# Patient Record
Sex: FEMALE | Race: WHITE | Employment: FULL TIME | URBAN - METROPOLITAN AREA
[De-identification: names, ages, dates, MRNs, and addresses within clinical notes are randomized per-mention and may not be internally consistent; named-entity substitution may affect disease eponyms.]

---

## 2017-10-13 ENCOUNTER — OFFICE VISIT (OUTPATIENT)
Dept: FAMILY MEDICINE CLINIC | Age: 32
End: 2017-10-13

## 2017-10-13 VITALS
DIASTOLIC BLOOD PRESSURE: 70 MMHG | BODY MASS INDEX: 18.95 KG/M2 | SYSTOLIC BLOOD PRESSURE: 124 MMHG | TEMPERATURE: 98.4 F | WEIGHT: 103 LBS | HEART RATE: 64 BPM | HEIGHT: 62 IN

## 2017-10-13 DIAGNOSIS — R21 RASH AND NONSPECIFIC SKIN ERUPTION: Primary | ICD-10-CM

## 2017-10-13 DIAGNOSIS — M25.541 ARTHRALGIA OF BOTH HANDS: ICD-10-CM

## 2017-10-13 DIAGNOSIS — M25.542 ARTHRALGIA OF BOTH HANDS: ICD-10-CM

## 2017-10-13 LAB
A/G RATIO: 1.7 (ref 1.1–2.2)
ALBUMIN SERPL-MCNC: 4.5 G/DL (ref 3.4–5)
ALP BLD-CCNC: 28 U/L (ref 40–129)
ALT SERPL-CCNC: 34 U/L (ref 10–40)
ANION GAP SERPL CALCULATED.3IONS-SCNC: 15 MMOL/L (ref 3–16)
AST SERPL-CCNC: 47 U/L (ref 15–37)
BASOPHILS ABSOLUTE: 0.1 K/UL (ref 0–0.2)
BASOPHILS RELATIVE PERCENT: 1 %
BILIRUB SERPL-MCNC: <0.2 MG/DL (ref 0–1)
BUN BLDV-MCNC: 14 MG/DL (ref 7–20)
C-REACTIVE PROTEIN: 0.4 MG/L (ref 0–5.1)
CALCIUM SERPL-MCNC: 9.7 MG/DL (ref 8.3–10.6)
CHLORIDE BLD-SCNC: 99 MMOL/L (ref 99–110)
CO2: 31 MMOL/L (ref 21–32)
CREAT SERPL-MCNC: 0.7 MG/DL (ref 0.6–1.1)
EOSINOPHILS ABSOLUTE: 0.2 K/UL (ref 0–0.6)
EOSINOPHILS RELATIVE PERCENT: 1.8 %
GFR AFRICAN AMERICAN: >60
GFR NON-AFRICAN AMERICAN: >60
GLOBULIN: 2.6 G/DL
GLUCOSE BLD-MCNC: 107 MG/DL (ref 70–99)
HCT VFR BLD CALC: 41.9 % (ref 36–48)
HEMOGLOBIN: 13.7 G/DL (ref 12–16)
LYMPHOCYTES ABSOLUTE: 4.2 K/UL (ref 1–5.1)
LYMPHOCYTES RELATIVE PERCENT: 46.3 %
MCH RBC QN AUTO: 31.6 PG (ref 26–34)
MCHC RBC AUTO-ENTMCNC: 32.7 G/DL (ref 31–36)
MCV RBC AUTO: 96.3 FL (ref 80–100)
MONOCYTES ABSOLUTE: 0.6 K/UL (ref 0–1.3)
MONOCYTES RELATIVE PERCENT: 7.1 %
NEUTROPHILS ABSOLUTE: 3.9 K/UL (ref 1.7–7.7)
NEUTROPHILS RELATIVE PERCENT: 43.8 %
PDW BLD-RTO: 12.3 % (ref 12.4–15.4)
PLATELET # BLD: 133 K/UL (ref 135–450)
PLATELET SLIDE REVIEW: ABNORMAL
PMV BLD AUTO: 11.7 FL (ref 5–10.5)
POTASSIUM SERPL-SCNC: 3.7 MMOL/L (ref 3.5–5.1)
RBC # BLD: 4.35 M/UL (ref 4–5.2)
RBC # BLD: NORMAL 10*6/UL
RHEUMATOID FACTOR: <10 IU/ML
SEDIMENTATION RATE, ERYTHROCYTE: 10 MM/HR (ref 0–20)
SLIDE REVIEW: ABNORMAL
SODIUM BLD-SCNC: 145 MMOL/L (ref 136–145)
TOTAL PROTEIN: 7.1 G/DL (ref 6.4–8.2)
WBC # BLD: 9 K/UL (ref 4–11)

## 2017-10-13 PROCEDURE — 36415 COLL VENOUS BLD VENIPUNCTURE: CPT | Performed by: NURSE PRACTITIONER

## 2017-10-13 PROCEDURE — 99204 OFFICE O/P NEW MOD 45 MIN: CPT | Performed by: NURSE PRACTITIONER

## 2017-10-13 NOTE — PROGRESS NOTES
Subjective:      Patient ID: Renee Woodruff is a 29 y.o. female. HPI Pt is here to establish care. Pt is 5 months clean of heroine. Pt is having peeling of hands 2 weeks ago. Pt said her fingers are numb. The hands are peeling which is painful. Patient was checked 2 months ago for hepatitis C and HIV which were both negative. Patient has not been using any new chemicals or other substances which could cause rash on skin. Patient does have family history of lupus and rheumatoid arthritis. Patient is having pain in bilateral hand joints and feet. Patient is having peeling of feet as well. Patient does janitorial work. Review of Systems   Constitutional: Positive for fatigue. Negative for chills and fever. Musculoskeletal: Positive for arthralgias. Skin: Positive for rash. Bilateral hand peeling   Neurological: Positive for weakness. All other systems reviewed and are negative. Objective:   Physical Exam   Constitutional: She is oriented to person, place, and time. She appears well-developed and well-nourished. HENT:   Crusted sore on inner  Bilateral nares   Cardiovascular: Normal rate, regular rhythm and normal heart sounds. No murmur heard. Pulmonary/Chest: Effort normal and breath sounds normal. She has no wheezes. She has no rales. Musculoskeletal:   Decrease in  strength in bilateral hands. Neurological: She is alert and oriented to person, place, and time. Skin: Skin is warm and dry. Rash noted. Skin peeling on bilateral hands. Patient also has skin peeling on bilateral feet. Psychiatric: She has a normal mood and affect. Judgment and thought content normal.   Vitals reviewed. Assessment:      1.  Rash and nonspecific skin eruption  RIANA    COMPREHENSIVE METABOLIC PANEL    CBC Auto Differential    SEDIMENTATION RATE    C-REACTIVE PROTEIN    RPR    silver sulfADIAZINE (SILVADENE) 1 % cream    mupirocin (BACTROBAN) 2 % ointment    Lauren Franks MD

## 2017-10-14 LAB — RPR: NORMAL

## 2017-10-16 ENCOUNTER — TELEPHONE (OUTPATIENT)
Dept: INTERNAL MEDICINE CLINIC | Age: 32
End: 2017-10-16

## 2017-10-16 LAB
ANA INTERPRETATION: NORMAL
ANTI-NUCLEAR ANTIBODY (ANA): NEGATIVE

## 2017-10-16 NOTE — TELEPHONE ENCOUNTER
Pt is calling for the results of labs drawn on Friday Oct 13, pt can be reached today at [phone].

## 2019-06-24 ENCOUNTER — APPOINTMENT (OUTPATIENT)
Dept: CT IMAGING | Age: 34
DRG: 343 | End: 2019-06-24
Payer: COMMERCIAL

## 2019-06-24 ENCOUNTER — HOSPITAL ENCOUNTER (INPATIENT)
Age: 34
LOS: 1 days | Discharge: HOME | DRG: 343 | End: 2019-06-25
Attending: EMERGENCY MEDICINE | Admitting: SURGERY
Payer: COMMERCIAL

## 2019-06-24 DIAGNOSIS — Z90.49 S/P LAPAROSCOPIC APPENDECTOMY: ICD-10-CM

## 2019-06-24 DIAGNOSIS — R11.0 NAUSEA WITHOUT VOMITING: ICD-10-CM

## 2019-06-24 DIAGNOSIS — R10.31 ABDOMINAL PAIN, RIGHT LOWER QUADRANT: ICD-10-CM

## 2019-06-24 DIAGNOSIS — K37 APPENDICITIS, UNSPECIFIED APPENDICITIS TYPE: ICD-10-CM

## 2019-06-24 DIAGNOSIS — K35.20 ACUTE APPENDICITIS WITH GENERALIZED PERITONITIS, WITHOUT GANGRENE OR ABSCESS, UNSPECIFIED WHETHER PERFORATION PRESENT: Primary | ICD-10-CM

## 2019-06-24 DIAGNOSIS — D72.829 LEUKOCYTOSIS, UNSPECIFIED TYPE: ICD-10-CM

## 2019-06-24 DIAGNOSIS — R74.8 ELEVATED LIVER ENZYMES: ICD-10-CM

## 2019-06-24 PROBLEM — K35.30 ACUTE APPENDICITIS WITH LOCALIZED PERITONITIS, WITHOUT PERFORATION OR GANGRENE: Status: ACTIVE | Noted: 2019-06-24

## 2019-06-24 LAB
A/G RATIO: 1.5 (ref 1.1–2.2)
ALBUMIN SERPL-MCNC: 4.9 G/DL (ref 3.4–5)
ALP BLD-CCNC: 55 U/L (ref 40–129)
ALT SERPL-CCNC: 547 U/L (ref 10–40)
AMORPHOUS: ABNORMAL /HPF
ANION GAP SERPL CALCULATED.3IONS-SCNC: 16 MMOL/L (ref 3–16)
AST SERPL-CCNC: 581 U/L (ref 15–37)
BACTERIA: ABNORMAL /HPF
BASOPHILS ABSOLUTE: 0.1 K/UL (ref 0–0.2)
BASOPHILS RELATIVE PERCENT: 0.4 %
BILIRUB SERPL-MCNC: 0.6 MG/DL (ref 0–1)
BILIRUBIN URINE: NEGATIVE
BLOOD, URINE: NEGATIVE
BUN BLDV-MCNC: 11 MG/DL (ref 7–20)
CALCIUM SERPL-MCNC: 9.6 MG/DL (ref 8.3–10.6)
CHLORIDE BLD-SCNC: 101 MMOL/L (ref 99–110)
CLARITY: ABNORMAL
CO2: 25 MMOL/L (ref 21–32)
COLOR: YELLOW
CREAT SERPL-MCNC: <0.5 MG/DL (ref 0.6–1.1)
EOSINOPHILS ABSOLUTE: 0 K/UL (ref 0–0.6)
EOSINOPHILS RELATIVE PERCENT: 0 %
EPITHELIAL CELLS, UA: ABNORMAL /HPF
GFR AFRICAN AMERICAN: >60
GFR NON-AFRICAN AMERICAN: >60
GLOBULIN: 3.2 G/DL
GLUCOSE BLD-MCNC: 87 MG/DL (ref 70–99)
GLUCOSE URINE: NEGATIVE MG/DL
HCG(URINE) PREGNANCY TEST: NEGATIVE
HCT VFR BLD CALC: 43.1 % (ref 36–48)
HEMOGLOBIN: 14.2 G/DL (ref 12–16)
KETONES, URINE: 15 MG/DL
LEUKOCYTE ESTERASE, URINE: NEGATIVE
LIPASE: 23 U/L (ref 13–60)
LYMPHOCYTES ABSOLUTE: 0.8 K/UL (ref 1–5.1)
LYMPHOCYTES RELATIVE PERCENT: 5.4 %
MCH RBC QN AUTO: 33 PG (ref 26–34)
MCHC RBC AUTO-ENTMCNC: 33 G/DL (ref 31–36)
MCV RBC AUTO: 100 FL (ref 80–100)
MICROSCOPIC EXAMINATION: YES
MONOCYTES ABSOLUTE: 0.4 K/UL (ref 0–1.3)
MONOCYTES RELATIVE PERCENT: 2.6 %
MUCUS: ABNORMAL /LPF
NEUTROPHILS ABSOLUTE: 13.4 K/UL (ref 1.7–7.7)
NEUTROPHILS RELATIVE PERCENT: 91.6 %
NITRITE, URINE: NEGATIVE
PDW BLD-RTO: 11.7 % (ref 12.4–15.4)
PH UA: 8 (ref 5–8)
PLATELET # BLD: 130 K/UL (ref 135–450)
PMV BLD AUTO: 11.9 FL (ref 5–10.5)
POTASSIUM SERPL-SCNC: 4.4 MMOL/L (ref 3.5–5.1)
PROTEIN UA: ABNORMAL MG/DL
RBC # BLD: 4.31 M/UL (ref 4–5.2)
RBC UA: ABNORMAL /HPF (ref 0–2)
SODIUM BLD-SCNC: 142 MMOL/L (ref 136–145)
SPECIFIC GRAVITY UA: 1.02 (ref 1–1.03)
TOTAL PROTEIN: 8.1 G/DL (ref 6.4–8.2)
URINE TYPE: ABNORMAL
UROBILINOGEN, URINE: 0.2 E.U./DL
WBC # BLD: 14.7 K/UL (ref 4–11)
WBC UA: ABNORMAL /HPF (ref 0–5)

## 2019-06-24 PROCEDURE — 83690 ASSAY OF LIPASE: CPT

## 2019-06-24 PROCEDURE — 96361 HYDRATE IV INFUSION ADD-ON: CPT

## 2019-06-24 PROCEDURE — 80074 ACUTE HEPATITIS PANEL: CPT

## 2019-06-24 PROCEDURE — 81001 URINALYSIS AUTO W/SCOPE: CPT

## 2019-06-24 PROCEDURE — 6360000002 HC RX W HCPCS: Performed by: NURSE PRACTITIONER

## 2019-06-24 PROCEDURE — 6360000004 HC RX CONTRAST MEDICATION: Performed by: NURSE PRACTITIONER

## 2019-06-24 PROCEDURE — 36415 COLL VENOUS BLD VENIPUNCTURE: CPT

## 2019-06-24 PROCEDURE — 1200000000 HC SEMI PRIVATE

## 2019-06-24 PROCEDURE — 85025 COMPLETE CBC W/AUTO DIFF WBC: CPT

## 2019-06-24 PROCEDURE — 96372 THER/PROPH/DIAG INJ SC/IM: CPT

## 2019-06-24 PROCEDURE — 96374 THER/PROPH/DIAG INJ IV PUSH: CPT

## 2019-06-24 PROCEDURE — 74177 CT ABD & PELVIS W/CONTRAST: CPT

## 2019-06-24 PROCEDURE — 99285 EMERGENCY DEPT VISIT HI MDM: CPT

## 2019-06-24 PROCEDURE — 2580000003 HC RX 258: Performed by: NURSE PRACTITIONER

## 2019-06-24 PROCEDURE — 80053 COMPREHEN METABOLIC PANEL: CPT

## 2019-06-24 PROCEDURE — 84703 CHORIONIC GONADOTROPIN ASSAY: CPT

## 2019-06-24 RX ORDER — DICYCLOMINE HYDROCHLORIDE 10 MG/ML
10 INJECTION INTRAMUSCULAR ONCE
Status: COMPLETED | OUTPATIENT
Start: 2019-06-24 | End: 2019-06-24

## 2019-06-24 RX ORDER — ONDANSETRON 2 MG/ML
4 INJECTION INTRAMUSCULAR; INTRAVENOUS ONCE
Status: COMPLETED | OUTPATIENT
Start: 2019-06-24 | End: 2019-06-24

## 2019-06-24 RX ORDER — 0.9 % SODIUM CHLORIDE 0.9 %
1000 INTRAVENOUS SOLUTION INTRAVENOUS ONCE
Status: COMPLETED | OUTPATIENT
Start: 2019-06-24 | End: 2019-06-24

## 2019-06-24 RX ADMIN — ONDANSETRON 4 MG: 2 INJECTION INTRAMUSCULAR; INTRAVENOUS at 20:17

## 2019-06-24 RX ADMIN — DICYCLOMINE HYDROCHLORIDE 10 MG: 20 INJECTION, SOLUTION INTRAMUSCULAR at 20:17

## 2019-06-24 RX ADMIN — IOPAMIDOL 75 ML: 755 INJECTION, SOLUTION INTRAVENOUS at 21:46

## 2019-06-24 RX ADMIN — SODIUM CHLORIDE 1000 ML: 9 INJECTION, SOLUTION INTRAVENOUS at 20:18

## 2019-06-24 ASSESSMENT — ENCOUNTER SYMPTOMS
COUGH: 0
NAUSEA: 1
BACK PAIN: 0
COLOR CHANGE: 0
DIARRHEA: 0
SHORTNESS OF BREATH: 0
VOMITING: 1
ABDOMINAL PAIN: 1
WHEEZING: 0

## 2019-06-24 ASSESSMENT — PAIN SCALES - GENERAL
PAINLEVEL_OUTOF10: 7
PAINLEVEL_OUTOF10: 7
PAINLEVEL_OUTOF10: 4

## 2019-06-24 ASSESSMENT — PAIN DESCRIPTION - PAIN TYPE
TYPE: ACUTE PAIN
TYPE: ACUTE PAIN

## 2019-06-24 ASSESSMENT — PAIN DESCRIPTION - LOCATION
LOCATION: ABDOMEN

## 2019-06-24 ASSESSMENT — PAIN DESCRIPTION - ORIENTATION: ORIENTATION: RIGHT;LEFT;LOWER;UPPER

## 2019-06-24 NOTE — ED PROVIDER NOTES
[facility] [place]  [facility]  [place] [place]NCOUNTER      Pt Name: Justin Vasquez  NRJ:[id number]  [place] 1985  Date of evaluation: 6/24/2019  Provider: SOREN Valenzuela CNP      Chief Complaint:    Chief Complaint   Patient presents with    Abdominal Pain     pain from the upper to lower abdomen, throwing up - pain started a few days ago and yesterday she started cramping and last night throwing up started.  Emesis       Nursing Notes, Past Medical Hx, Past Surgical Hx, Social Hx, Allergies, and Family Hx were all reviewed and agreed with or any disagreements were addressed in the HPI.    HPI:  (Location, Duration, Timing, Severity,Quality, Assoc Sx, Context, Modifying factors)  This is a  30 y.o. female who presents today with diffuse abdominal pain that began about 2-3 days ago. She states pain got worse last night. She has had nausea with vomiting, vomited 6 times today. Denies any diarrhea. She denies any vaginal bleeding or discharge, possible pregnancy. She denies any urinary symptoms. She denies any heroin use. PastMedical/Surgical History:      Diagnosis Date    Heroin abuse ([facility] [address] 4/15/2015    Unspecified breast disorder          Procedure Laterality Date    MOUTH SURGERY      tooth extractions       Medications:  Previous Medications    No medications on file         Review of Systems:  Review of Systems   Constitutional: Negative for chills and fever. HENT: Negative for congestion. Respiratory: Negative for cough, shortness of breath and wheezing. Cardiovascular: Negative for chest pain. Gastrointestinal: Positive for abdominal pain, nausea and vomiting. Negative for diarrhea. Patient was have diffuse abdominal cramping associated with nausea and vomiting however she has tenderness in the right lower quadrant. She is vomited 6 times since yesterday.    Genitourinary: Negative for difficulty urinating, dysuria, vaginal bleeding and vaginal discharge. She denies any vaginal bleeding or discharge, no urinary symptoms   Musculoskeletal: Negative for back pain. Skin: Negative for color change. Neurological: Negative for weakness, numbness and headaches. Positives and Pertinent negatives as per HPI. Except as noted above in the ROS, problem specific ROS was completed and is negative. Physical Exam:  Physical Exam   Constitutional: She is oriented to person, place, and time. She appears well-developed and well-nourished. HENT:   Head: Normocephalic. Right Ear: External ear normal.   Left Ear: External ear normal.   Mouth/Throat: Oropharynx is clear and moist.   Eyes: Right eye exhibits no discharge. Left eye exhibits no discharge. No scleral icterus. Neck: Normal range of motion. Neck supple. Cardiovascular: Normal rate and normal heart sounds. Pulmonary/Chest: Effort normal and breath sounds normal. No respiratory distress. Abdominal: Soft. There is tenderness. There is guarding. Abdomen flat soft nondistended. Bowel sounds are hypoactive, tenderness in the right lower quadrant of the abdomen, she has tenderness and guarding on exam, no acute ascites or rigidity. No rebound tenderness. Musculoskeletal: Normal range of motion. Neurological: She is alert and oriented to person, place, and time. GCS eye subscore is 4. GCS verbal subscore is 5. GCS motor subscore is 6. Skin: Skin is warm. She is not diaphoretic. No pallor. Psychiatric: She has a normal mood and affect. Her behavior is normal.   Nursing note and vitals reviewed.       MEDICAL DECISION MAKING    Vitals:    Vitals:    06/24/19 1900 06/24/19 2023 06/24/19 2111 06/24/19 2238   BP: 129/83 118/75 123/73 113/72   Pulse: 83 89 80 73   Resp: 18 18 18 18   Temp:       TempSrc:       SpO2: 99% 99% 98% 99%   Weight:       Height:           LABS:  Labs Reviewed   CBC WITH AUTO DIFFERENTIAL - Abnormal; Notable for the following components:       Result Value    WBC 14.7 (*)     RDW 11.7 (*)     Platelets 049 (*)     MPV 11.9 (*)     Neutrophils # 13.4 (*)     Lymphocytes # 0.8 (*)     All other components within normal limits    Narrative:     Performed at:  [place] [address] [facility] [employer]   Phone [phone]   COMPREHENSIVE METABOLIC PANEL - Abnormal; Notable for the following components:    CREATININE <0.5 (*)      (*)      (*)     All other components within normal limits    Narrative:     Performed at:  [place] [address] [employer]   Phone [phone]   URINALYSIS - Abnormal; Notable for the following components:    Clarity, UA SL CLOUDY (*)     Ketones, Urine 15 (*)     Protein, UA TRACE (*)     All other components within normal limits    Narrative:     Performed at:  [place] [address] [employer]   Phone [phone]   MICROSCOPIC URINALYSIS - Abnormal; Notable for the following components:    Mucus, UA 1+ (*)     Bacteria, UA Rare (*)     Amorphous, UA 2+ (*)     All other components within normal limits    Narrative:     Performed at:  [place] [address] [employer]   Phone [phone]   LIPASE    Narrative:     Performed at:  [place] [address] [employer]   Phone [phone]   PREGNANCY, URINE    Narrative:     Performed at:  [address], [facility] [employer]   Phone  of labs reviewed and werenegative at this time or not returned at the time of this note.     RADIOLOGY:   Non-plain film images such as CT, Ultrasound and MRI are read by the radiologist. Leyda JALLOH, SOREN - CNP have directly visualized the radiologic plain film image(s) with the below findings:        Interpretation per the Radiologist below, if available at the time of thisnote:    CT ABDOMEN PELVIS W IV CONTRAST Additional Contrast? None   Final Result   Non perforated acute appendicitis. No results found. MEDICAL DECISION MAKING / ED COURSE:      PROCEDURES:   Procedures    None    Patient was given:  Medications   0.9 % sodium chloride bolus (0 mLs Intravenous Stopped 6/24/19 2238)   dicyclomine (BENTYL) injection 10 mg (10 mg Intramuscular Given 6/24/19 2017)   ondansetron (ZOFRAN) injection 4 mg (4 mg Intravenous Given 6/24/19 2017)   iopamidol (ISOVUE-370) 76 % injection 75 mL (75 mLs Intravenous Given 6/24/19 2146)       Patient presents today with complaints of right lower quadrant and generalized abdominal cramping associate with nausea and vomiting, has vomited 6 times. After evaluation in his admission patient IV access, blood work, pain medicine, nausea medicine and a CT scan of the abdomen were ordered. Urinalysis is no acute infection. Pregnancy is negative. Ketones of 15 should improve with IV fluids. CBC shows a leukocytosis with a white count of 14,700, no acute anemia. Metabolic panel shows no significant electrolyte disturbances or renal insufficiency. ALT and AST are both elevated and ALT of 547 and AST of 581, I did add on a hepatitis panel however, I do believe this is related to the patient's history of drug use. CT of the abdomen shows a nonperforated acute appendicitis. I then paged general surgery on call. At 2322 I spoke with Dr. Milo Butterfield, general surgeon on-call, we discussed the patient's case at length and he agreed to accept the patient for admission. Patient to be admitted to hospital for further evaluation management of care. The patient tolerated their visit well. I evaluated the patient. The physician was available for consultation as needed.   The patient and / or the family were informed of the results of anytests, a time was

## 2019-06-24 NOTE — LETTER
[address] [facility] [address]  Phone: [phone]    Dr. Yael Carmona      June 25, 2019     Patient: Veronica Cheema   YOB: 1985   Date of Visit: 6/24/2019       To Whom It May Concern: It is my medical opinion that Kira Henley may not return to work until follow up appointment with surgeon in two weeks. If you have any questions or concerns, please don't hesitate to call.     Sincerely,        Dr. Yael Carmona

## 2019-06-25 ENCOUNTER — ANESTHESIA (OUTPATIENT)
Dept: OPERATING ROOM | Age: 34
DRG: 343 | End: 2019-06-25
Payer: COMMERCIAL

## 2019-06-25 ENCOUNTER — ANESTHESIA EVENT (OUTPATIENT)
Dept: OPERATING ROOM | Age: 34
DRG: 343 | End: 2019-06-25
Payer: COMMERCIAL

## 2019-06-25 VITALS — TEMPERATURE: 98.6 F | DIASTOLIC BLOOD PRESSURE: 79 MMHG | OXYGEN SATURATION: 100 % | SYSTOLIC BLOOD PRESSURE: 102 MMHG

## 2019-06-25 VITALS
OXYGEN SATURATION: 95 % | WEIGHT: 112.9 LBS | HEART RATE: 74 BPM | RESPIRATION RATE: 15 BRPM | SYSTOLIC BLOOD PRESSURE: 99 MMHG | TEMPERATURE: 97.8 F | DIASTOLIC BLOOD PRESSURE: 64 MMHG | HEIGHT: 62 IN | BODY MASS INDEX: 20.78 KG/M2

## 2019-06-25 LAB
ALBUMIN SERPL-MCNC: 3.5 G/DL (ref 3.4–5)
ALP BLD-CCNC: 42 U/L (ref 40–129)
ALT SERPL-CCNC: 357 U/L (ref 10–40)
ANION GAP SERPL CALCULATED.3IONS-SCNC: 11 MMOL/L (ref 3–16)
AST SERPL-CCNC: 338 U/L (ref 15–37)
BASOPHILS ABSOLUTE: 0 K/UL (ref 0–0.2)
BASOPHILS RELATIVE PERCENT: 0.4 %
BILIRUB SERPL-MCNC: 0.7 MG/DL (ref 0–1)
BILIRUBIN DIRECT: <0.2 MG/DL (ref 0–0.3)
BILIRUBIN, INDIRECT: ABNORMAL MG/DL (ref 0–1)
BUN BLDV-MCNC: 11 MG/DL (ref 7–20)
CALCIUM SERPL-MCNC: 8.6 MG/DL (ref 8.3–10.6)
CHLORIDE BLD-SCNC: 106 MMOL/L (ref 99–110)
CO2: 24 MMOL/L (ref 21–32)
CREAT SERPL-MCNC: <0.5 MG/DL (ref 0.6–1.1)
EOSINOPHILS ABSOLUTE: 0 K/UL (ref 0–0.6)
EOSINOPHILS RELATIVE PERCENT: 0.3 %
GFR AFRICAN AMERICAN: >60
GFR NON-AFRICAN AMERICAN: >60
GLUCOSE BLD-MCNC: 66 MG/DL (ref 70–99)
GLUCOSE BLD-MCNC: 74 MG/DL (ref 70–99)
HAV IGM SER IA-ACNC: ABNORMAL
HCT VFR BLD CALC: 37.2 % (ref 36–48)
HEMOGLOBIN: 12.7 G/DL (ref 12–16)
HEPATITIS B CORE IGM ANTIBODY: ABNORMAL
HEPATITIS B SURFACE ANTIGEN INTERPRETATION: ABNORMAL
HEPATITIS C ANTIBODY INTERPRETATION: REACTIVE
LYMPHOCYTES ABSOLUTE: 2 K/UL (ref 1–5.1)
LYMPHOCYTES RELATIVE PERCENT: 21.9 %
MAGNESIUM: 2.2 MG/DL (ref 1.8–2.4)
MCH RBC QN AUTO: 33.7 PG (ref 26–34)
MCHC RBC AUTO-ENTMCNC: 34 G/DL (ref 31–36)
MCV RBC AUTO: 99.1 FL (ref 80–100)
MONOCYTES ABSOLUTE: 0.7 K/UL (ref 0–1.3)
MONOCYTES RELATIVE PERCENT: 7.7 %
NEUTROPHILS ABSOLUTE: 6.5 K/UL (ref 1.7–7.7)
NEUTROPHILS RELATIVE PERCENT: 69.7 %
PDW BLD-RTO: 12 % (ref 12.4–15.4)
PERFORMED ON: NORMAL
PHOSPHORUS: 3.7 MG/DL (ref 2.5–4.9)
PLATELET # BLD: 100 K/UL (ref 135–450)
PLATELET SLIDE REVIEW: ABNORMAL
PMV BLD AUTO: 11.7 FL (ref 5–10.5)
POTASSIUM SERPL-SCNC: 3.7 MMOL/L (ref 3.5–5.1)
RBC # BLD: 3.75 M/UL (ref 4–5.2)
SLIDE REVIEW: ABNORMAL
SODIUM BLD-SCNC: 141 MMOL/L (ref 136–145)
TOTAL PROTEIN: 6.1 G/DL (ref 6.4–8.2)
WBC # BLD: 9.3 K/UL (ref 4–11)

## 2019-06-25 PROCEDURE — 87070 CULTURE OTHR SPECIMN AEROBIC: CPT

## 2019-06-25 PROCEDURE — 84100 ASSAY OF PHOSPHORUS: CPT

## 2019-06-25 PROCEDURE — 87205 SMEAR GRAM STAIN: CPT

## 2019-06-25 PROCEDURE — 3700000001 HC ADD 15 MINUTES (ANESTHESIA): Performed by: SURGERY

## 2019-06-25 PROCEDURE — 0DTJ4ZZ RESECTION OF APPENDIX, PERCUTANEOUS ENDOSCOPIC APPROACH: ICD-10-PCS | Performed by: SURGERY

## 2019-06-25 PROCEDURE — 80076 HEPATIC FUNCTION PANEL: CPT

## 2019-06-25 PROCEDURE — 85025 COMPLETE CBC W/AUTO DIFF WBC: CPT

## 2019-06-25 PROCEDURE — 88304 TISSUE EXAM BY PATHOLOGIST: CPT

## 2019-06-25 PROCEDURE — 6370000000 HC RX 637 (ALT 250 FOR IP): Performed by: SURGERY

## 2019-06-25 PROCEDURE — 6360000002 HC RX W HCPCS: Performed by: SURGERY

## 2019-06-25 PROCEDURE — 44970 LAPAROSCOPY APPENDECTOMY: CPT | Performed by: SURGERY

## 2019-06-25 PROCEDURE — 2500000003 HC RX 250 WO HCPCS: Performed by: SURGERY

## 2019-06-25 PROCEDURE — 99220 PR INITIAL OBSERVATION CARE/DAY 70 MINUTES: CPT | Performed by: SURGERY

## 2019-06-25 PROCEDURE — 6360000002 HC RX W HCPCS: Performed by: NURSE ANESTHETIST, CERTIFIED REGISTERED

## 2019-06-25 PROCEDURE — 80048 BASIC METABOLIC PNL TOTAL CA: CPT

## 2019-06-25 PROCEDURE — 7100000001 HC PACU RECOVERY - ADDTL 15 MIN: Performed by: SURGERY

## 2019-06-25 PROCEDURE — 2709999900 HC NON-CHARGEABLE SUPPLY: Performed by: SURGERY

## 2019-06-25 PROCEDURE — 2580000003 HC RX 258: Performed by: NURSE ANESTHETIST, CERTIFIED REGISTERED

## 2019-06-25 PROCEDURE — 36415 COLL VENOUS BLD VENIPUNCTURE: CPT

## 2019-06-25 PROCEDURE — 2500000003 HC RX 250 WO HCPCS: Performed by: NURSE ANESTHETIST, CERTIFIED REGISTERED

## 2019-06-25 PROCEDURE — 2580000003 HC RX 258: Performed by: SURGERY

## 2019-06-25 PROCEDURE — 2720000010 HC SURG SUPPLY STERILE: Performed by: SURGERY

## 2019-06-25 PROCEDURE — 3600000014 HC SURGERY LEVEL 4 ADDTL 15MIN: Performed by: SURGERY

## 2019-06-25 PROCEDURE — 7100000000 HC PACU RECOVERY - FIRST 15 MIN: Performed by: SURGERY

## 2019-06-25 PROCEDURE — 3600000004 HC SURGERY LEVEL 4 BASE: Performed by: SURGERY

## 2019-06-25 PROCEDURE — 3700000000 HC ANESTHESIA ATTENDED CARE: Performed by: SURGERY

## 2019-06-25 PROCEDURE — 83735 ASSAY OF MAGNESIUM: CPT

## 2019-06-25 RX ORDER — BUPIVACAINE HYDROCHLORIDE AND EPINEPHRINE 5; 5 MG/ML; UG/ML
INJECTION, SOLUTION PERINEURAL PRN
Status: DISCONTINUED | OUTPATIENT
Start: 2019-06-25 | End: 2019-06-25 | Stop reason: ALTCHOICE

## 2019-06-25 RX ORDER — ACETAMINOPHEN 325 MG/1
650 TABLET ORAL EVERY 6 HOURS PRN
Status: DISCONTINUED | OUTPATIENT
Start: 2019-06-25 | End: 2019-06-25 | Stop reason: HOSPADM

## 2019-06-25 RX ORDER — SODIUM CHLORIDE, SODIUM LACTATE, POTASSIUM CHLORIDE, CALCIUM CHLORIDE 600; 310; 30; 20 MG/100ML; MG/100ML; MG/100ML; MG/100ML
INJECTION, SOLUTION INTRAVENOUS CONTINUOUS PRN
Status: DISCONTINUED | OUTPATIENT
Start: 2019-06-25 | End: 2019-06-25 | Stop reason: SDUPTHER

## 2019-06-25 RX ORDER — HYDROMORPHONE HCL 110MG/55ML
0.25 PATIENT CONTROLLED ANALGESIA SYRINGE INTRAVENOUS EVERY 5 MIN PRN
Status: DISCONTINUED | OUTPATIENT
Start: 2019-06-25 | End: 2019-06-25 | Stop reason: HOSPADM

## 2019-06-25 RX ORDER — PROMETHAZINE HYDROCHLORIDE 25 MG/ML
6.25 INJECTION, SOLUTION INTRAMUSCULAR; INTRAVENOUS
Status: DISCONTINUED | OUTPATIENT
Start: 2019-06-25 | End: 2019-06-25 | Stop reason: HOSPADM

## 2019-06-25 RX ORDER — PROMETHAZINE HYDROCHLORIDE 25 MG/ML
6.25 INJECTION, SOLUTION INTRAMUSCULAR; INTRAVENOUS EVERY 6 HOURS PRN
Status: DISCONTINUED | OUTPATIENT
Start: 2019-06-25 | End: 2019-06-25 | Stop reason: HOSPADM

## 2019-06-25 RX ORDER — OXYCODONE HYDROCHLORIDE 5 MG/1
5 TABLET ORAL EVERY 4 HOURS PRN
Status: CANCELLED | OUTPATIENT
Start: 2019-06-25

## 2019-06-25 RX ORDER — SODIUM CHLORIDE 9 MG/ML
INJECTION, SOLUTION INTRAVENOUS CONTINUOUS
Status: DISCONTINUED | OUTPATIENT
Start: 2019-06-25 | End: 2019-06-25

## 2019-06-25 RX ORDER — PROPOFOL 10 MG/ML
INJECTION, EMULSION INTRAVENOUS PRN
Status: DISCONTINUED | OUTPATIENT
Start: 2019-06-25 | End: 2019-06-25 | Stop reason: SDUPTHER

## 2019-06-25 RX ORDER — OXYCODONE HYDROCHLORIDE 5 MG/1
10 TABLET ORAL EVERY 4 HOURS PRN
Status: CANCELLED | OUTPATIENT
Start: 2019-06-25

## 2019-06-25 RX ORDER — ONDANSETRON 2 MG/ML
INJECTION INTRAMUSCULAR; INTRAVENOUS PRN
Status: DISCONTINUED | OUTPATIENT
Start: 2019-06-25 | End: 2019-06-25 | Stop reason: SDUPTHER

## 2019-06-25 RX ORDER — LIDOCAINE HYDROCHLORIDE 20 MG/ML
INJECTION, SOLUTION INFILTRATION; PERINEURAL PRN
Status: DISCONTINUED | OUTPATIENT
Start: 2019-06-25 | End: 2019-06-25 | Stop reason: SDUPTHER

## 2019-06-25 RX ORDER — DEXTROSE AND SODIUM CHLORIDE 5; .45 G/100ML; G/100ML
INJECTION, SOLUTION INTRAVENOUS CONTINUOUS
Status: DISCONTINUED | OUTPATIENT
Start: 2019-06-25 | End: 2019-06-25 | Stop reason: HOSPADM

## 2019-06-25 RX ORDER — TRAMADOL HYDROCHLORIDE 50 MG/1
50 TABLET ORAL EVERY 6 HOURS PRN
Qty: 16 TABLET | Refills: 0 | Status: SHIPPED | OUTPATIENT
Start: 2019-06-25 | End: 2019-06-30

## 2019-06-25 RX ORDER — OXYCODONE HYDROCHLORIDE 5 MG/1
5-10 TABLET ORAL EVERY 6 HOURS PRN
Qty: 20 TABLET | Refills: 0 | Status: SHIPPED | OUTPATIENT
Start: 2019-06-25 | End: 2019-06-25 | Stop reason: HOSPADM

## 2019-06-25 RX ORDER — DEXAMETHASONE SODIUM PHOSPHATE 10 MG/ML
INJECTION INTRAMUSCULAR; INTRAVENOUS PRN
Status: DISCONTINUED | OUTPATIENT
Start: 2019-06-25 | End: 2019-06-25 | Stop reason: SDUPTHER

## 2019-06-25 RX ORDER — KETOROLAC TROMETHAMINE 30 MG/ML
30 INJECTION, SOLUTION INTRAMUSCULAR; INTRAVENOUS EVERY 8 HOURS PRN
Status: DISCONTINUED | OUTPATIENT
Start: 2019-06-25 | End: 2019-06-25 | Stop reason: HOSPADM

## 2019-06-25 RX ORDER — TRAMADOL HYDROCHLORIDE 50 MG/1
50 TABLET ORAL EVERY 6 HOURS PRN
Status: DISCONTINUED | OUTPATIENT
Start: 2019-06-25 | End: 2019-06-25 | Stop reason: HOSPADM

## 2019-06-25 RX ORDER — ONDANSETRON 2 MG/ML
4 INJECTION INTRAMUSCULAR; INTRAVENOUS EVERY 6 HOURS PRN
Status: DISCONTINUED | OUTPATIENT
Start: 2019-06-25 | End: 2019-06-25 | Stop reason: HOSPADM

## 2019-06-25 RX ORDER — HYDROMORPHONE HCL 110MG/55ML
PATIENT CONTROLLED ANALGESIA SYRINGE INTRAVENOUS PRN
Status: DISCONTINUED | OUTPATIENT
Start: 2019-06-25 | End: 2019-06-25 | Stop reason: SDUPTHER

## 2019-06-25 RX ORDER — HYDROMORPHONE HCL 110MG/55ML
0.5 PATIENT CONTROLLED ANALGESIA SYRINGE INTRAVENOUS EVERY 5 MIN PRN
Status: DISCONTINUED | OUTPATIENT
Start: 2019-06-25 | End: 2019-06-25 | Stop reason: HOSPADM

## 2019-06-25 RX ORDER — ONDANSETRON 2 MG/ML
4 INJECTION INTRAMUSCULAR; INTRAVENOUS
Status: DISCONTINUED | OUTPATIENT
Start: 2019-06-25 | End: 2019-06-25 | Stop reason: HOSPADM

## 2019-06-25 RX ORDER — ONDANSETRON 4 MG/1
4 TABLET, ORALLY DISINTEGRATING ORAL EVERY 8 HOURS PRN
Qty: 12 TABLET | Refills: 0 | Status: SHIPPED | OUTPATIENT
Start: 2019-06-25 | End: 2019-07-15

## 2019-06-25 RX ORDER — FENTANYL CITRATE 50 UG/ML
INJECTION, SOLUTION INTRAMUSCULAR; INTRAVENOUS PRN
Status: DISCONTINUED | OUTPATIENT
Start: 2019-06-25 | End: 2019-06-25 | Stop reason: SDUPTHER

## 2019-06-25 RX ORDER — LABETALOL HYDROCHLORIDE 5 MG/ML
INJECTION, SOLUTION INTRAVENOUS PRN
Status: DISCONTINUED | OUTPATIENT
Start: 2019-06-25 | End: 2019-06-25 | Stop reason: SDUPTHER

## 2019-06-25 RX ORDER — FENTANYL CITRATE 50 UG/ML
50 INJECTION, SOLUTION INTRAMUSCULAR; INTRAVENOUS EVERY 5 MIN PRN
Status: DISCONTINUED | OUTPATIENT
Start: 2019-06-25 | End: 2019-06-25 | Stop reason: HOSPADM

## 2019-06-25 RX ORDER — ROCURONIUM BROMIDE 10 MG/ML
INJECTION, SOLUTION INTRAVENOUS PRN
Status: DISCONTINUED | OUTPATIENT
Start: 2019-06-25 | End: 2019-06-25 | Stop reason: SDUPTHER

## 2019-06-25 RX ORDER — MIDAZOLAM HYDROCHLORIDE 1 MG/ML
INJECTION INTRAMUSCULAR; INTRAVENOUS PRN
Status: DISCONTINUED | OUTPATIENT
Start: 2019-06-25 | End: 2019-06-25 | Stop reason: SDUPTHER

## 2019-06-25 RX ORDER — KETOROLAC TROMETHAMINE 30 MG/ML
INJECTION, SOLUTION INTRAMUSCULAR; INTRAVENOUS PRN
Status: DISCONTINUED | OUTPATIENT
Start: 2019-06-25 | End: 2019-06-25 | Stop reason: SDUPTHER

## 2019-06-25 RX ORDER — NICOTINE 21 MG/24HR
1 PATCH, TRANSDERMAL 24 HOURS TRANSDERMAL DAILY
Status: DISCONTINUED | OUTPATIENT
Start: 2019-06-25 | End: 2019-06-25 | Stop reason: HOSPADM

## 2019-06-25 RX ORDER — FENTANYL CITRATE 50 UG/ML
25 INJECTION, SOLUTION INTRAMUSCULAR; INTRAVENOUS EVERY 5 MIN PRN
Status: DISCONTINUED | OUTPATIENT
Start: 2019-06-25 | End: 2019-06-25 | Stop reason: HOSPADM

## 2019-06-25 RX ADMIN — SODIUM CHLORIDE: 9 INJECTION, SOLUTION INTRAVENOUS at 00:43

## 2019-06-25 RX ADMIN — MIDAZOLAM HYDROCHLORIDE 2 MG: 2 INJECTION, SOLUTION INTRAMUSCULAR; INTRAVENOUS at 08:16

## 2019-06-25 RX ADMIN — SODIUM CHLORIDE, POTASSIUM CHLORIDE, SODIUM LACTATE AND CALCIUM CHLORIDE: 600; 310; 30; 20 INJECTION, SOLUTION INTRAVENOUS at 08:15

## 2019-06-25 RX ADMIN — PROPOFOL 300 MG: 10 INJECTION, EMULSION INTRAVENOUS at 08:18

## 2019-06-25 RX ADMIN — MEROPENEM 1 G: 1 INJECTION, POWDER, FOR SOLUTION INTRAVENOUS at 00:44

## 2019-06-25 RX ADMIN — KETOROLAC TROMETHAMINE 30 MG: 30 INJECTION, SOLUTION INTRAMUSCULAR at 15:07

## 2019-06-25 RX ADMIN — DEXAMETHASONE SODIUM PHOSPHATE 10 MG: 10 INJECTION INTRAMUSCULAR; INTRAVENOUS at 08:30

## 2019-06-25 RX ADMIN — MIDAZOLAM HYDROCHLORIDE 2 MG: 2 INJECTION, SOLUTION INTRAMUSCULAR; INTRAVENOUS at 08:14

## 2019-06-25 RX ADMIN — FENTANYL CITRATE 100 MCG: 50 INJECTION, SOLUTION INTRAMUSCULAR; INTRAVENOUS at 08:18

## 2019-06-25 RX ADMIN — KETOROLAC TROMETHAMINE 30 MG: 30 INJECTION, SOLUTION INTRAMUSCULAR; INTRAVENOUS at 08:42

## 2019-06-25 RX ADMIN — FAMOTIDINE 20 MG: 10 INJECTION, SOLUTION INTRAVENOUS at 08:12

## 2019-06-25 RX ADMIN — LABETALOL HYDROCHLORIDE 5 MG: 5 INJECTION, SOLUTION INTRAVENOUS at 08:33

## 2019-06-25 RX ADMIN — FENTANYL CITRATE 50 MCG: 50 INJECTION, SOLUTION INTRAMUSCULAR; INTRAVENOUS at 08:24

## 2019-06-25 RX ADMIN — ROCURONIUM BROMIDE 40 MG: 10 SOLUTION INTRAVENOUS at 08:18

## 2019-06-25 RX ADMIN — LIDOCAINE HYDROCHLORIDE 3 ML: 20 INJECTION, SOLUTION INFILTRATION; PERINEURAL at 08:18

## 2019-06-25 RX ADMIN — TRAMADOL HYDROCHLORIDE 50 MG: 50 TABLET, FILM COATED ORAL at 11:23

## 2019-06-25 RX ADMIN — ONDANSETRON 4 MG: 2 INJECTION INTRAMUSCULAR; INTRAVENOUS at 08:30

## 2019-06-25 RX ADMIN — FAMOTIDINE 20 MG: 10 INJECTION, SOLUTION INTRAVENOUS at 00:43

## 2019-06-25 RX ADMIN — LABETALOL HYDROCHLORIDE 5 MG: 5 INJECTION, SOLUTION INTRAVENOUS at 08:35

## 2019-06-25 RX ADMIN — FENTANYL CITRATE 100 MCG: 50 INJECTION, SOLUTION INTRAMUSCULAR; INTRAVENOUS at 08:16

## 2019-06-25 RX ADMIN — HYDROMORPHONE HYDROCHLORIDE 2 MG: 2 INJECTION INTRAMUSCULAR; INTRAVENOUS; SUBCUTANEOUS at 08:37

## 2019-06-25 RX ADMIN — DEXTROSE AND SODIUM CHLORIDE: 5; 450 INJECTION, SOLUTION INTRAVENOUS at 06:44

## 2019-06-25 RX ADMIN — KETOROLAC TROMETHAMINE 30 MG: 30 INJECTION, SOLUTION INTRAMUSCULAR at 06:47

## 2019-06-25 RX ADMIN — ONDANSETRON 4 MG: 2 INJECTION INTRAMUSCULAR; INTRAVENOUS at 06:47

## 2019-06-25 RX ADMIN — SUGAMMADEX 200 MG: 100 INJECTION, SOLUTION INTRAVENOUS at 08:45

## 2019-06-25 ASSESSMENT — PULMONARY FUNCTION TESTS
PIF_VALUE: 16
PIF_VALUE: 19
PIF_VALUE: 13
PIF_VALUE: 12
PIF_VALUE: 15
PIF_VALUE: 1
PIF_VALUE: 1
PIF_VALUE: 2
PIF_VALUE: 18
PIF_VALUE: 2
PIF_VALUE: 14
PIF_VALUE: 26
PIF_VALUE: 11
PIF_VALUE: 9
PIF_VALUE: 17
PIF_VALUE: 21
PIF_VALUE: 14
PIF_VALUE: 2
PIF_VALUE: 13
PIF_VALUE: 14
PIF_VALUE: 21
PIF_VALUE: 1
PIF_VALUE: 16
PIF_VALUE: 16
PIF_VALUE: 21
PIF_VALUE: 16
PIF_VALUE: 1
PIF_VALUE: 17
PIF_VALUE: 18
PIF_VALUE: 1
PIF_VALUE: 13
PIF_VALUE: 13
PIF_VALUE: 19
PIF_VALUE: 11
PIF_VALUE: 19
PIF_VALUE: 1
PIF_VALUE: 2
PIF_VALUE: 1
PIF_VALUE: 13
PIF_VALUE: 21
PIF_VALUE: 14
PIF_VALUE: 17

## 2019-06-25 ASSESSMENT — PAIN SCALES - GENERAL
PAINLEVEL_OUTOF10: 7
PAINLEVEL_OUTOF10: 0
PAINLEVEL_OUTOF10: 5
PAINLEVEL_OUTOF10: 4
PAINLEVEL_OUTOF10: 0

## 2019-06-25 ASSESSMENT — LIFESTYLE VARIABLES: SMOKING_STATUS: 1

## 2019-06-25 NOTE — ANESTHESIA POSTPROCEDURE EVALUATION
Department of Anesthesiology  Postprocedure Note    Patient: Adrianne Lozada  MRN: [id number]  YOB: 1985  Date of evaluation: 6/25/2019  Time:  1:50 PM     Procedure Summary     Date:  06/25/19 Room / Location:  [place] [address] 06 / [facility] OR    Anesthesia Start:  0815 Anesthesia Stop:  0902    Procedure:  LAPAROSCOPIC APPENDECTOMY (N/A ) Diagnosis:       Appendicitis, unspecified appendicitis type      (APPENDICITIS)    Surgeon:  Azul Singh MD Responsible Provider:  Rajendra Rocha MD    Anesthesia Type:  general ASA Status:  3          Anesthesia Type: general    Winnie Phase I: Winnie Score: 9    Winnie Phase II:      Last vitals: Reviewed and per EMR flowsheets.        Anesthesia Post Evaluation    Patient location during evaluation: PACU  Patient participation: complete - patient participated  Level of consciousness: awake and alert  Airway patency: patent  Nausea & Vomiting: no nausea and no vomiting  Complications: no  Cardiovascular status: blood pressure returned to baseline  Respiratory status: acceptable  Hydration status: stable

## 2019-06-25 NOTE — ANESTHESIA PRE PROCEDURE
Department of Anesthesiology  Preprocedure Note       Name:  Julieta Kaur   Age:  30 y.o.  :  1985                                          MRN:  [id number]         Date:  2019      Surgeon: Ephraim Biswas):  Barbara Flores MD    Procedure: LAPAROSCOPIC APPENDECTOMY (N/A )    Medications prior to admission:   Prior to Admission medications    Not on File       Current medications:    Current Facility-Administered Medications   Medication Dose Route Frequency Provider Last Rate Last Dose    promethazine (PHENERGAN) injection 6.25 mg  6.25 mg Intravenous Q6H PRN Eduardo Moritz, MD        ketorolac (TORADOL) injection 30 mg  30 mg Intravenous Q8H PRN Eduardo Moritz, MD   30 mg at 19 0647    enoxaparin (LOVENOX) injection 40 mg  40 mg Subcutaneous Daily Eduardo Moritz, MD        ondansetron [facility]) injection 4 mg  4 mg Intravenous Q6H PRN Eduardo Moritz, MD   4 mg at 19 1688    acetaminophen (TYLENOL) tablet 650 mg  650 mg Oral Q6H PRN Eduardo Moritz, MD        famotidine (PEPCID) injection 20 mg  20 mg Intravenous BID Eduardo Moritz, MD   20 mg at 19 0043    meropenem (MERREM) 1 g in sodium chloride 0.9 % 100 mL IVPB (mini-bag)  1 g Intravenous Irais Meyer MD   Stopped at 19 0115    nicotine (NICODERM CQ) 14 MG/24HR 1 patch  1 patch Transdermal Daily Eduardo Moritz, MD   1 patch at 19 0055    dextrose 5 % and 0.45 % sodium chloride infusion   Intravenous Continuous Eduardo Moritz,  mL/hr at 19 1783         Allergies:     Allergies   Allergen Reactions    Penicillins Hives    Codeine Anxiety       Problem List:    Patient Active Problem List   Diagnosis Code    Prenatal care insufficient O09.30    Substance abuse ([facility] [address] F19.10    CAP (community acquired pneumonia) J18.9    Heroin abuse ([facility] [address] F11.10    Pleuritic chest pain R07.81    Acute appendicitis with localized peritonitis, without perforation or gangrene K35.30    Appendicitis K37       Past Medical History:        Diagnosis Date    Heroin abuse ([facility] [address] 4/15/2015    Unspecified breast disorder        Past Surgical History:        Procedure Laterality Date    MOUTH SURGERY      tooth extractions       Social History:    Social History     Tobacco Use    Smoking status: Current Every Day Smoker     Packs/day: 1.00     Years: 11.00     Pack years: 11.00     Types: Cigarettes    Smokeless tobacco: Never Used   Substance Use Topics    Alcohol use: No                                Ready to quit: Not Answered  Counseling given: Not Answered      Vital Signs (Current):   Vitals:    06/24/19 2111 06/24/19 2238 06/25/19 0006 06/25/19 0014   BP: 123/73 113/72 (!) 112/57 113/65   Pulse: 80 73  63   Resp: 18 18  18   Temp:    98.1 °F (36.7 °C)   TempSrc:    Oral   SpO2: 98% 99%  100%   Weight:    112 lb 14.4 oz (51.2 kg)   Height:    5' 2\" (1.575 m)                                              BP Readings from Last 3 Encounters:   06/25/19 113/65   10/13/17 124/70   10/09/17 120/80       NPO Status:                                                                                 BMI:   Wt Readings from Last 3 Encounters:   06/25/19 112 lb 14.4 oz (51.2 kg)   10/13/17 103 lb (46.7 kg)   10/09/17 105 lb (47.6 kg)     Body mass index is 20.65 kg/m².     CBC:   Lab Results   Component Value Date    WBC 9.3 06/25/2019    RBC 3.75 06/25/2019    HGB 12.7 06/25/2019    HCT 37.2 06/25/2019    MCV 99.1 06/25/2019    RDW 12.0 06/25/2019     06/25/2019       CMP:   Lab Results   Component Value Date     06/25/2019    K 3.7 06/25/2019     06/25/2019    CO2 24 06/25/2019    BUN 11 06/25/2019    CREATININE <0.5 06/25/2019    GFRAA >60 06/25/2019    GFRAA >60 01/09/2012    AGRATIO 1.5 06/24/2019    LABGLOM >60 06/25/2019    GLUCOSE 66 06/25/2019    PROT 6.1 06/25/2019    PROT 5.3 01/09/2012    CALCIUM 8.6 06/25/2019    BILITOT 0.7 06/25/2019 ALKPHOS 42 06/25/2019     06/25/2019     06/25/2019       POC Tests:   Recent Labs     06/25/19  0365   POCGLU 74       Coags:   Lab Results   Component Value Date    PROTIME 9.9 01/09/2012    INR 0.91 01/09/2012    APTT 27.8 01/09/2012       HCG (If Applicable):   Lab Results   Component Value Date    PREGTESTUR Negative 06/24/2019        ABGs: No results found for: PHART, PO2ART, SVF8PGM, HQO5HYA, BEART, Q4CQIYEC     Type & Screen (If Applicable):  Lab Results   Component Value Date    LABABO O 01/08/2012    [address] Positive 01/08/2012       Anesthesia Evaluation  Patient summary reviewed and Nursing notes reviewed no history of anesthetic complications:   Airway: Mallampati: II  TM distance: >3 FB   Neck ROM: full  Mouth opening: > = 3 FB Dental: normal exam     Comment: Edentulous on top, many missing on bottom. Remaining are not loose    Pulmonary:normal exam  breath sounds clear to auscultation  (+) pneumonia: resolved,  current smoker    (-) recent URI and sleep apnea          Patient did not smoke on day of surgery. Cardiovascular:Negative CV ROS            Rhythm: regular  Rate: normal                    Neuro/Psych:                ROS comment: Hx of heroin abuse  Last use 1 year ago  ETOH dahlia  No drugs currently GI/Hepatic/Renal:        (-) hiatal hernia, GERD, liver disease and no renal disease      ROS comment: Acute Appy. Endo/Other: Negative Endo/Other ROS                    Abdominal:           Vascular: negative vascular ROS. Anesthesia Plan      general     ASA 3       Induction: intravenous. MIPS: Postoperative opioids intended and Prophylactic antiemetics administered. Anesthetic plan and risks discussed with patient. Plan discussed with CRNA.     Attending anesthesiologist reviewed and agrees with Segundo Garcia MD   6/25/2019

## 2019-06-25 NOTE — OP NOTE
Date of Surgery: 6/25/19    Preop Dx:  Acute Appendicitis    Postop Dx:  Same    Procedure:  Laparoscopic Appendectomy    Surgeon:  Renetta Louis    Assistant:      Anesthesia:  GETA    EBL:   <50ml    Specimen:  appendix    Complications: none    Drains/Lines:  none    Indications:  31 yo with acute appendicitis    Description:  Patient was given adequate description of the risks and rewards of the procedure, including bleeding, infection, possible injury to surrounding structures, and possibility of open procedure and freely consented. Patient was given appropriate antibiotics and brought to the OR where GETA anesthesia was induced. She was placed in supine position. Prepped and draped in usual sterile fashion. Local anesthetic injected in periumbilical region and incision made in epidermis allowing for insertion of 5mm optiview trocar. Once it was inserted the abdomen was insufflated with CO2 to 15mmHg pressure. The 30 degree laparoscope was then inserted and peritoneal cavity was inspected. Next, local anesthetic was injected in the left lower quadrant and under direct visualization a 12mm trocar was inserted. The patient then had similar insertion of a 5mm trocar in the suprapubic space. The cecum was exposed and taenia followed to where they coalesced at the base of the appendix. A window was created in the base of the mesoappendix. The appendix was retrocecal.  Using the sonacision the mesoappendix was controlled. Next, using a blue load on the linear stapler, the base of the appendix was stapled across without injury noticed to any surrounding structures. The appendix was placed in an endoretrieval bag, removed from the abdomen and sent as specimen. The area was then copiously irrigated with all fluid being suctioned off. Hemostasis was assured and staple lines were intact. Under direct visualization the 12mm trocar and 5mm trocar were removed without bleeding seen at their insertion sites. The abdomen was allowed to desufflate and the final trocar was removed. The 12mm trocar site had the fascia closed with 0-0 vicryl figure of eight suture. Then, all trocar sites had the epidermis reapproximated with 4-0 monocryl subcuticular suture. Sterile dressing placed. All suture, sponge and instrument count correct times two at end of case. Transferred to PACU in stable condition.     Lesa Del Real MD

## 2019-06-25 NOTE — PROGRESS NOTES
Received to PACU, handoff report at bedside from RN and CRNA. Oral airway in place on arrival, oxygen applied via nasal cannula. Vitals stable. Lap sites x 3 clean, dry, intact.

## 2019-06-25 NOTE — CARE COORDINATION
Chart reviewed by  for potential needs. Pt admitted via ED for acute appendicitis. Appendectomy scheduled for today. No immediate case mgmt needs identified. Please consult case mgmt if needs arise.      Sonia Christianson RN DCP

## 2019-06-25 NOTE — PROGRESS NOTES
4 Eyes Skin Assessment     The patient is being assess for   Transfer to New Unit    I agree that 2 RN's have performed a thorough Head to Toe Skin Assessment on the patient. ALL assessment sites listed below have been assessed. Areas assessed by both nurses:   [x]   Head, Face, and Ears   [x]   Shoulders, Back, and Chest, Abdomen  [x]   Arms, Elbows, and Hands   [x]   Coccyx, Sacrum, and Ischium  [x]   Legs, Feet, and Heels      **SHARE this note so that the co-signing nurse is able to place an eSignature**    Co-signer eSignature: {Esignature:[id number]}    Does the Patient have Skin Breakdown?   No          Stef Prevention initiated:  No   Wound Care Orders initiated:  No      WOC nurse consulted for Pressure Injury (Stage 3,4, Unstageable, DTI, NWPT, Complex wounds)and New or Established Ostomies:  No      Primary Nurse eSignature: Electronically signed by Tricia Love RN on 6/25/19 at 9:53 AM

## 2019-06-25 NOTE — H&P
Department of General Surgery - Adult   History and Physical      PATIENT NAME: Corinne Bane OF BIRTH: 1985    ADMISSION DATE: 6/24/2019  6:28 PM      TODAY'S DATE: 6/25/2019    CHIEF COMPLAINT:  Abdominal pain      HISTORY OF PRESENT ILLNESS:  The patient is a 30 y.o. female  who presents with abdominal pain. Started several days ago and was diffuse but now more concentrated in right lower abdomen. Some nausea and emesis. Some fevers. Past Medical History:        Diagnosis Date    Heroin abuse ([facility] [address] 4/15/2015    Unspecified breast disorder        Past Surgical History:        Procedure Laterality Date    MOUTH SURGERY      tooth extractions       Medications Prior to Admission:   Prior to Admission medications    Not on File       Allergies:  Penicillins and Codeine    Social History:   TOBACCO:  yes  ETOH: no    Family History:       Problem Relation Age of Onset    Diabetes Maternal Grandmother     Stroke Maternal Grandmother     Arthritis Mother         RA       REVIEW OF SYSTEMS:    CONSTITUTIONAL:  positive for  fevers  HEENT:  Negative  RESPIRATORY:  negative  CARDIOVASCULAR:  negative  GASTROINTESTINAL:  positive for nausea, vomiting and abdominal pain  GENITOURINARY:  negative  HEMATOLOGIC/LYMPHATIC:  negative  ENDOCRINE:  Negative  NEUROLOGICAL:  Negative  * All other ROS reviewed and negative. PHYSICAL EXAM:    VITALS:  /74   Pulse 61   Temp 98 °F (36.7 °C) (Oral)   Resp 20   Ht 5' 2\" (1.575 m)   Wt 112 lb 14.4 oz (51.2 kg)   LMP 04/22/2019 (Approximate)   SpO2 100%   BMI 20.65 kg/m²   INTAKE/OUTPUT:   I/O last 3 completed shifts: In: 374 [I.V.:274; IV Piggyback:100]  Out: -   No intake/output data recorded.   CONSTITUTIONAL:  awake, alert, no apparent distress and normal weight  ENT:  normocepalic, without obvious abnormality  NECK:  supple, symmetrical, trachea midline   LUNGS:  clear to auscultation  CARDIOVASCULAR:  regular rate and rhythm and no murmur noted  ABDOMEN:  , normal bowel sounds, soft, non-distended, tenderness noted in the right lower quadrant, voluntary guarding absent, no masses palpated  MUSCULOSKELETAL:  0+ pitting edema lower extremities  NEUROLOGIC:  Mental Status Exam:  Level of Alertness:   awake  Orientation:   person, place, time  SKIN:  no bruising or bleeding      DATA:  CBC:   Recent Labs     06/24/19 2011 06/25/19  0509   WBC 14.7* 9.3   HGB 14.2 12.7   HCT 43.1 37.2   * 100*     BMP:    Recent Labs     06/24/19 2011 06/25/19  0509    141   K 4.4 3.7    106   CO2 25 24   BUN 11 11   CREATININE <0.5* <0.5*   GLUCOSE 87 66*     Hepatic:   Recent Labs     06/24/19 2011 06/25/19  0509   * 338*   * 357*   BILITOT 0.6 0.7   ALKPHOS 55 42     Mag:      Recent Labs     06/25/19  0509   MG 2.20      Phos:     Recent Labs     06/25/19  0509   PHOS 3.7      INR: No results for input(s): INR in the last 72 hours. Radiology Review: Images personally reviewed by me. CT - nonperforated appendicitis      ASSESSMENT AND PLAN:  31 yo with acute appendicitis  1. Discussed her diagnosis and indications for appendectomy  2. Has been NPO, continue IVF  3.  abx given IV      PRACTITIONER CERTIFICATION   I certify that Jhonathan Heaton is expected to be hospitalized for >2 days based on the above assessment and plan.       Electronically signed by Giovanna Handley, [address] [place] [address] Surgery  [id number]

## 2019-06-26 LAB
ANAEROBIC CULTURE: NORMAL
GRAM STAIN RESULT: NORMAL
WOUND/ABSCESS: NORMAL

## 2019-06-28 NOTE — DISCHARGE SUMMARY
Surgery Discharge Summary    Patient Identification  Laurie Lane is a 30 y.o. female. :  1985  Admit Date:  2019    Discharge date:   2019  6:58 PM                                   Disposition: home    Discharge Diagnoses:   Principal Problem:    Acute appendicitis with localized peritonitis, without perforation or gangrene  Active Problems:    Appendicitis  Resolved Problems:    * No resolved hospital problems. *      Discharge condition: good    Discharge Medications:     Discharge Medication List as of 2019  4:34 PM             Discharge Medication List as of 2019  4:34 PM      START taking these medications    Details   ondansetron (ZOFRAN ODT) 4 MG disintegrating tablet Take 1 tablet by mouth every 8 hours as needed for Nausea or Vomiting, Disp-12 tablet, R-0Print      traMADol (ULTRAM) 50 MG tablet Take 1 tablet by mouth every 6 hours as needed for Pain for up to 5 days. Intended supply: 5 days. Take lowest dose possible to manage pain, Disp-16 tablet, R-0Print               Most Recent Labs:    CBC: No results for input(s): WBC, HGB, HCT, PLT in the last 72 hours. BMP:  No results for input(s): NA, K, CL, CO2, BUN, CREATININE, GLUCOSE in the last 72 hours. Hepatic: No results for input(s): AST, ALT, ALB, BILITOT, ALKPHOS in the last 72 hours. PT/INR:  No results for input(s): INR in the last 72 hours. Consults: none    Surgery: lap appy    Patient Instructions: Activity: no heavy lifting, pushing, pulling for 1 weeks, no driving for 1 weeks or while on analgesics  Diet: As tolerated  Follow-up with carlos in 2 weeks. The patient and/or family/patient representatives, were provided education regarding discharge instructions, ongoing treatment and follow-up. Details of information given to the patient may be found in the discharge instructions located in the EMR. HPI and Hospital Course:   Patient admitted and underwent lap appy.   Postop once pain controlled and no nausea was discharged home.       [facility]

## 2019-07-01 ENCOUNTER — TELEPHONE (OUTPATIENT)
Dept: SURGERY | Age: 34
End: 2019-07-01

## 2019-07-15 ENCOUNTER — OFFICE VISIT (OUTPATIENT)
Dept: SURGERY | Age: 34
End: 2019-07-15

## 2019-07-15 VITALS
WEIGHT: 112 LBS | HEIGHT: 62 IN | SYSTOLIC BLOOD PRESSURE: 110 MMHG | DIASTOLIC BLOOD PRESSURE: 60 MMHG | BODY MASS INDEX: 20.61 KG/M2

## 2019-07-15 DIAGNOSIS — Z90.49 S/P LAPAROSCOPIC APPENDECTOMY: Primary | ICD-10-CM

## 2019-07-15 PROCEDURE — 99024 POSTOP FOLLOW-UP VISIT: CPT | Performed by: SURGERY

## 2019-07-19 PROBLEM — Z90.49 S/P LAPAROSCOPIC APPENDECTOMY: Status: ACTIVE | Noted: 2019-07-19

## 2019-07-19 NOTE — PROGRESS NOTES
HPI: Nursing notes reviewed. Patient is a 31 yo who underwent laparoscopic appendectomy at [facility].   Reports minimal pain and no nausea. Energy is near normal.  no diarrhea and no constipation. ROS:  10 point review of systems performed with pertinent positives in HPI    Phys:    Abd - soft, minimal tender, nondistended   Incisions - no erythema    Assesment: 31 yo s/p laparoscopic appendectomy    Plan: 1. Doing well postop with minimal pain and no nausea   2. No activity limitations   3. No dietary restrictions   4.   Call with concerns

## 2019-10-30 ENCOUNTER — HOSPITAL ENCOUNTER (EMERGENCY)
Age: 34
Discharge: HOME | End: 2019-10-30
Payer: COMMERCIAL

## 2019-10-30 ENCOUNTER — APPOINTMENT (OUTPATIENT)
Dept: GENERAL RADIOLOGY | Age: 34
End: 2019-10-30
Payer: COMMERCIAL

## 2019-10-30 VITALS
DIASTOLIC BLOOD PRESSURE: 58 MMHG | HEIGHT: 62 IN | HEART RATE: 94 BPM | BODY MASS INDEX: 20.61 KG/M2 | OXYGEN SATURATION: 100 % | TEMPERATURE: 97.4 F | SYSTOLIC BLOOD PRESSURE: 122 MMHG | RESPIRATION RATE: 16 BRPM | WEIGHT: 112 LBS

## 2019-10-30 DIAGNOSIS — S50.01XA CONTUSION OF RIGHT ELBOW, INITIAL ENCOUNTER: Primary | ICD-10-CM

## 2019-10-30 PROCEDURE — 73080 X-RAY EXAM OF ELBOW: CPT

## 2019-10-30 PROCEDURE — 99283 EMERGENCY DEPT VISIT LOW MDM: CPT

## 2019-10-30 RX ORDER — IBUPROFEN 600 MG/1
600 TABLET ORAL ONCE
Status: COMPLETED | OUTPATIENT
Start: 2019-10-30 | End: 2019-10-30

## 2019-10-30 RX ADMIN — IBUPROFEN 600 MG: 600 TABLET ORAL at 17:29

## 2019-10-31 ASSESSMENT — ENCOUNTER SYMPTOMS
VOMITING: 0
NAUSEA: 0

## 2021-05-06 ENCOUNTER — OFFICE VISIT (OUTPATIENT)
Dept: FAMILY MEDICINE CLINIC | Facility: CLINIC | Age: 36
End: 2021-05-06

## 2021-05-06 VITALS
WEIGHT: 131.2 LBS | DIASTOLIC BLOOD PRESSURE: 70 MMHG | OXYGEN SATURATION: 97 % | TEMPERATURE: 99.3 F | SYSTOLIC BLOOD PRESSURE: 110 MMHG | HEIGHT: 61 IN | BODY MASS INDEX: 24.77 KG/M2 | RESPIRATION RATE: 20 BRPM | HEART RATE: 90 BPM

## 2021-05-06 DIAGNOSIS — Z3A.13 13 WEEKS GESTATION OF PREGNANCY: Primary | ICD-10-CM

## 2021-05-06 PROCEDURE — 99203 OFFICE O/P NEW LOW 30 MIN: CPT | Performed by: FAMILY MEDICINE

## 2021-05-06 NOTE — PROGRESS NOTES
"Chief Complaint  NP / establish PCP and referral to OB (pt is 13 wks pregnant )    Subjective          Tatyana Jones presents to [facility]  History of Present Illness  Here to establish care   She is currently 13 weeks pregnant. She initially saw OB at [facility] at 8 weeks, completed labs and ultrasound at that visit. She would like to establish care with another Obgyn, needing later appointments due to her work schedule.   LMP: 2/3/21     The following portions of the patient's history were reviewed and updated as appropriate: allergies, current medications, past family history, past medical history, past social history, past surgical history and problem list.    Objective   Vital Signs:   /70   Pulse 90   Temp 99.3 °F (37.4 °C)   Resp 20   Ht 154.9 cm (61\")   Wt 59.5 kg (131 lb 3.2 oz)   SpO2 97%   BMI 24.79 kg/m²     Physical Exam  Vitals and nursing note reviewed.   Constitutional:       Appearance: She is well-developed.   HENT:      Head: Normocephalic and atraumatic.      Right Ear: External ear normal.      Left Ear: External ear normal.      Nose: Nose normal.   Eyes:      Conjunctiva/sclera: Conjunctivae normal.   Cardiovascular:      Rate and Rhythm: Normal rate and regular rhythm.      Heart sounds: Normal heart sounds. No murmur heard.     Pulmonary:      Effort: Pulmonary effort is normal.      Breath sounds: Normal breath sounds. No wheezing.   Musculoskeletal:         General: No swelling or deformity.      Cervical back: Neck supple.   Lymphadenopathy:      Cervical: No cervical adenopathy.   Skin:     General: Skin is warm and dry.   Neurological:      General: No focal deficit present.      Mental Status: She is alert and oriented to person, place, and time.   Psychiatric:         Mood and Affect: Mood normal.         Behavior: Behavior normal.        Result Review :                 Assessment and Plan    Diagnoses and all orders for this " visit:    1. 13 weeks gestation of pregnancy (Primary)    Will refer her to Obgyn       Follow Up   No follow-ups on file.  Patient was given instructions and counseling regarding her condition or for health maintenance advice. Please see specific information pulled into the AVS if appropriate.

## 2021-05-07 NOTE — PATIENT INSTRUCTIONS
Second Trimester of Pregnancy    The second trimester of pregnancy is from week 14 through week 27 (months 4 through 6). The second trimester is often a time when you feel your best. Your body has adjusted to being pregnant, and you begin to feel better physically.  During the second trimester:  · Usually, morning sickness has lessened or quit completely.  · You may have more energy.  · You may have an increase in appetite.  The second trimester is also a time when the unborn baby (fetus) is growing rapidly. At the end of the sixth month, the fetus is about 9 inches long and weighs about 1½ pounds. You will likely begin to feel the baby move (quickening) between 16 and 20 weeks of pregnancy.  Body changes during your second trimester  Your body continues to go through many changes during your second trimester. The changes vary from woman to woman.  Physical changes  · Your weight will continue to increase. You will notice your lower abdomen bulging out.  · You may begin to get stretch marks on your hips, abdomen, and breasts.  · Your breasts will continue to grow and continue to become tender.  · Dark spots or blotches (chloasma or mask of pregnancy) may develop on your face. This will likely fade after the baby is born.  · A dark line from your belly button to the pubic area (linea nigra) may appear. This will likely fade after the baby is born.  · You may have changes in your hair. These can include thickening of your hair, rapid growth, and changes in texture. Some women also have hair loss during or after pregnancy, or hair that feels dry or thin. Your hair will most likely return to normal after your baby is born.  Health changes  · You may develop headaches that can be relieved by medicines. The medicines should be approved by your health care provider.  · You may develop or continue to have heartburn.  · You may develop constipation because certain hormones are causing the muscles that push waste through your  intestines to slow down.  · You may develop hemorrhoids or swollen, bulging veins (varicose veins).  · Your gums may bleed and may be sensitive to brushing and flossing.  · You may urinate more often because the fetus is pressing on your bladder.  · You may have back pain. This is caused by:  ? Weight gain.  ? Pregnancy hormones that are relaxing the joints in your pelvis.  ? A shift in weight and the muscles that support your balance.  What to expect at prenatal visits  During a routine prenatal visit, your health care provider will do a physical exam and other tests. He or she will also discuss your overall health.  Physical exam  · You will be weighed to make sure you and the fetus are growing normally.  · Your blood pressure will be taken.  · Your abdomen will be measured to track your baby's growth.  · The fetal heartbeat will be listened to.  · Any test results from the previous visit will be discussed.  Tests  Tests that may be done during your second trimester include:  · Blood tests that check for:  ? Low iron levels (anemia).  ? High blood sugar that affects pregnant women (gestational diabetes) between 24 and 28 weeks.  ? Rh antibodies, or Rh factor. This is to check for a protein in the immune system that can react to the blood of the unborn baby.  · Urine tests to check for infections, diabetes, or protein in the urine.  · An ultrasound to confirm the proper growth and development of the baby.  · An amniocentesis to check for possible genetic problems.  · Fetal screens for spina bifida and Down syndrome.    Talking with your health care provider  Your health care provider may ask you:  · How you are feeling.  · If you are feeling the baby move.  · If you have had any abnormal symptoms, such as leaking fluid, bleeding, severe headaches, continued nausea, or abdominal cramping.  · If you are using any tobacco products, including cigarettes, electronic cigarettes, and chewing tobacco.  · If you have any  questions.  Follow these instructions at home:  Medicines  · Follow your health care provider's instructions regarding medicine use. Specific medicines may be either safe or unsafe to take during pregnancy.  · Take a prenatal vitamin that contains at least 600 micrograms (mcg) of folic acid.  Eating and drinking    · Eat a healthy diet that includes fresh fruits and vegetables, whole grains, good sources of protein such as meat, eggs, or tofu, and low-fat dairy products. Your health care provider will help you determine the amount of weight gain that is right for you.  · Avoid raw meat and uncooked cheese. These carry germs that can cause birth defects in the baby.  · If you have low calcium intake from food, talk to your health care provider about whether you should take a daily calcium supplement.  · You may need to take these actions to prevent or treat constipation:  ? Drink enough fluid to keep your urine pale yellow.  ? Take over-the-counter or prescription medicines. Ask your health care provider before you take any medicines.  ? Eat foods that are high in fiber, such as fresh fruits and vegetables, whole grains, and beans.  ? Limit foods that are high in fat and processed sugars, such as fried or sweet foods.  Activity  · Exercise only as directed by your health care provider. Most women can continue their usual exercise routine during pregnancy. Try to exercise for 30 minutes at least 5 days a week. Stop exercising if you develop contractions in your uterus.  · Do not exercise if it is too hot or too humid, or if you are in a place of great height (high altitude).  · Avoid heavy lifting, wear low-heeled shoes, and practice good posture.  · You may continue to have sex unless your health care provider tells you not to.  Relieving pain and discomfort  · Wear a good support bra to prevent discomfort from breast tenderness.  · Take warm sitz baths to soothe any pain or discomfort caused by hemorrhoids. Use  hemorrhoid cream if your health care provider approves.  · Rest with your legs raised (elevated) if you have leg cramps or low back pain.  · If you develop varicose veins, wear support hose. Elevate your feet for 15 minutes, 3-4 times a day. Limit salt in your diet.  Prenatal care  · Write down your questions. Take them to your prenatal visits.  · Keep all your prenatal visits as told by your health care provider. This is important.  Safety  · Wear your seat belt at all times when driving.  · Talk to your health care provider if you are concerned about domestic abuse.  Lifestyle  · Do not use hot tubs, steam rooms, or saunas.  · Do not douche or use tampons or scented sanitary pads.  · Avoid cat litter boxes and soil used by cats. These carry germs that can cause birth defects in the baby and possibly loss of the fetus by miscarriage or stillbirth.  · Avoid herbal remedies, alcohol, street drugs, and medicines not prescribed by your health care provider. Chemicals in these products affect the formation and growth of the baby.  · Do not use any products that contain nicotine or tobacco, such as cigarettes, e-cigarettes, and chewing tobacco. If you need help quitting, ask your health care provider.  General instructions  · Ask your health care provider for a referral to a local prenatal education class. Begin classes no later than the beginning of month 6 of your pregnancy.  · Ask for help if you have counseling or nutritional needs during pregnancy. Your health care provider can offer advice or refer you to specialists for help with various needs.  · Do not cross your legs for long periods of time.  Where to find more information  · American Pregnancy Association: americanpregnancy.org  Contact a health care provider if you have:  · A headache that does not go away when you take medicine.  · Vision changes or you see spots in front of your eyes.  · Mild pelvic cramps, pelvic pressure, or nagging pain in the abdominal  area.  · Persistent nausea, vomiting, or diarrhea.  · A bad smelling vaginal discharge or foul-smelling urine.  · Pain when you urinate.  · Sudden or extreme swelling of your face, hands, ankles, feet, or legs.  Get help right away if you:  · Have a fever.  · Have fluid leaking from your vagina.  · Have spotting or bleeding from your vagina.  · Have severe abdominal cramping or pain.  · Have sudden weight loss or sudden weight gain.  · Have difficulty breathing.  · Have chest pain.  · Have not felt your baby move as instructed by your health care provider.  Summary  · The second trimester of pregnancy is from week 14 through week 27 (months 4 through 6). It is also a time when the fetus is growing rapidly.  · Your body goes through many changes during pregnancy. The changes vary from woman to woman.  · Avoid herbal remedies, alcohol, street drugs, and medicines not prescribed by your health care provider. These chemicals affect the formation and growth of your baby.  · Do not use any products that contain nicotine or tobacco, such as cigarettes, e-cigarettes, and chewing tobacco. If you need help quitting, ask your health care provider.  · Contact your health care provider if you have any questions. Keep all prenatal visits as told by your health care provider. This is important.  This information is not intended to replace advice given to you by your health care provider. Make sure you discuss any questions you have with your health care provider.  Document Revised: 09/17/2020 Document Reviewed: 09/08/2020  [employer] Patient Education © 2021 [employer]

## 2023-01-04 ENCOUNTER — APPOINTMENT (OUTPATIENT)
Dept: CT IMAGING | Age: 38
End: 2023-01-04
Payer: MEDICAID

## 2023-01-04 ENCOUNTER — HOSPITAL ENCOUNTER (EMERGENCY)
Age: 38
Discharge: LEFT BEFORE DISCHARGE | End: 2023-01-04
Attending: STUDENT IN AN ORGANIZED HEALTH CARE EDUCATION/TRAINING PROGRAM
Payer: MEDICAID

## 2023-01-04 VITALS
DIASTOLIC BLOOD PRESSURE: 88 MMHG | HEIGHT: 61 IN | BODY MASS INDEX: 20.77 KG/M2 | TEMPERATURE: 98 F | SYSTOLIC BLOOD PRESSURE: 144 MMHG | OXYGEN SATURATION: 100 % | WEIGHT: 110 LBS | RESPIRATION RATE: 16 BRPM | HEART RATE: 73 BPM

## 2023-01-04 DIAGNOSIS — Z53.29 LEFT AGAINST MEDICAL ADVICE: ICD-10-CM

## 2023-01-04 DIAGNOSIS — R10.33 PERIUMBILICAL ABDOMINAL PAIN: Primary | ICD-10-CM

## 2023-01-04 DIAGNOSIS — K59.00 CONSTIPATION, UNSPECIFIED CONSTIPATION TYPE: ICD-10-CM

## 2023-01-04 LAB
A/G RATIO: 1.7 (ref 1.1–2.2)
ALBUMIN SERPL-MCNC: 4.7 G/DL (ref 3.4–5)
ALP BLD-CCNC: 28 U/L (ref 40–129)
ALT SERPL-CCNC: 19 U/L (ref 10–40)
ANION GAP SERPL CALCULATED.3IONS-SCNC: 12 MMOL/L (ref 3–16)
AST SERPL-CCNC: 31 U/L (ref 15–37)
BACTERIA: ABNORMAL /HPF
BASOPHILS ABSOLUTE: 0 K/UL (ref 0–0.2)
BASOPHILS RELATIVE PERCENT: 0.7 %
BILIRUB SERPL-MCNC: <0.2 MG/DL (ref 0–1)
BILIRUBIN URINE: NEGATIVE
BLOOD, URINE: ABNORMAL
BUN BLDV-MCNC: 12 MG/DL (ref 7–20)
CALCIUM SERPL-MCNC: 10 MG/DL (ref 8.3–10.6)
CHLORIDE BLD-SCNC: 101 MMOL/L (ref 99–110)
CLARITY: ABNORMAL
CO2: 27 MMOL/L (ref 21–32)
COLOR: YELLOW
CREAT SERPL-MCNC: 0.7 MG/DL (ref 0.6–1.1)
EOSINOPHILS ABSOLUTE: 0.1 K/UL (ref 0–0.6)
EOSINOPHILS RELATIVE PERCENT: 1.9 %
EPITHELIAL CELLS, UA: ABNORMAL /HPF (ref 0–5)
GFR SERPL CREATININE-BSD FRML MDRD: >60 ML/MIN/{1.73_M2}
GLUCOSE BLD-MCNC: 121 MG/DL (ref 70–99)
GLUCOSE URINE: NEGATIVE MG/DL
HCG(URINE) PREGNANCY TEST: NEGATIVE
HCT VFR BLD CALC: 41.9 % (ref 36–48)
HEMOGLOBIN: 13.9 G/DL (ref 12–16)
KETONES, URINE: NEGATIVE MG/DL
LEUKOCYTE ESTERASE, URINE: ABNORMAL
LIPASE: 41 U/L (ref 13–60)
LYMPHOCYTES ABSOLUTE: 2.3 K/UL (ref 1–5.1)
LYMPHOCYTES RELATIVE PERCENT: 32.9 %
MCH RBC QN AUTO: 31.6 PG (ref 26–34)
MCHC RBC AUTO-ENTMCNC: 33.1 G/DL (ref 31–36)
MCV RBC AUTO: 95.4 FL (ref 80–100)
MICROSCOPIC EXAMINATION: YES
MONOCYTES ABSOLUTE: 0.5 K/UL (ref 0–1.3)
MONOCYTES RELATIVE PERCENT: 6.8 %
MUCUS: ABNORMAL /LPF
NEUTROPHILS ABSOLUTE: 4 K/UL (ref 1.7–7.7)
NEUTROPHILS RELATIVE PERCENT: 57.7 %
NITRITE, URINE: NEGATIVE
PDW BLD-RTO: 12.5 % (ref 12.4–15.4)
PH UA: 7 (ref 5–8)
PLATELET # BLD: 154 K/UL (ref 135–450)
PLATELET SLIDE REVIEW: ADEQUATE
PMV BLD AUTO: 12 FL (ref 5–10.5)
POTASSIUM SERPL-SCNC: 4 MMOL/L (ref 3.5–5.1)
PROTEIN UA: NEGATIVE MG/DL
RBC # BLD: 4.39 M/UL (ref 4–5.2)
RBC # BLD: NORMAL 10*6/UL
RBC UA: ABNORMAL /HPF (ref 0–4)
SLIDE REVIEW: ABNORMAL
SODIUM BLD-SCNC: 140 MMOL/L (ref 136–145)
SPECIFIC GRAVITY UA: 1.02 (ref 1–1.03)
TOTAL PROTEIN: 7.4 G/DL (ref 6.4–8.2)
URINE REFLEX TO CULTURE: ABNORMAL
URINE TYPE: ABNORMAL
UROBILINOGEN, URINE: 0.2 E.U./DL
WBC # BLD: 6.8 K/UL (ref 4–11)
WBC UA: ABNORMAL /HPF (ref 0–5)

## 2023-01-04 PROCEDURE — 85025 COMPLETE CBC W/AUTO DIFF WBC: CPT

## 2023-01-04 PROCEDURE — 6370000000 HC RX 637 (ALT 250 FOR IP): Performed by: STUDENT IN AN ORGANIZED HEALTH CARE EDUCATION/TRAINING PROGRAM

## 2023-01-04 PROCEDURE — 80053 COMPREHEN METABOLIC PANEL: CPT

## 2023-01-04 PROCEDURE — 87086 URINE CULTURE/COLONY COUNT: CPT

## 2023-01-04 PROCEDURE — 99284 EMERGENCY DEPT VISIT MOD MDM: CPT

## 2023-01-04 PROCEDURE — 36415 COLL VENOUS BLD VENIPUNCTURE: CPT

## 2023-01-04 PROCEDURE — 84703 CHORIONIC GONADOTROPIN ASSAY: CPT

## 2023-01-04 PROCEDURE — 6360000002 HC RX W HCPCS: Performed by: STUDENT IN AN ORGANIZED HEALTH CARE EDUCATION/TRAINING PROGRAM

## 2023-01-04 PROCEDURE — 81001 URINALYSIS AUTO W/SCOPE: CPT

## 2023-01-04 PROCEDURE — 83690 ASSAY OF LIPASE: CPT

## 2023-01-04 PROCEDURE — 96374 THER/PROPH/DIAG INJ IV PUSH: CPT

## 2023-01-04 RX ORDER — KETOROLAC TROMETHAMINE 30 MG/ML
15 INJECTION, SOLUTION INTRAMUSCULAR; INTRAVENOUS ONCE
Status: COMPLETED | OUTPATIENT
Start: 2023-01-04 | End: 2023-01-04

## 2023-01-04 RX ORDER — KETOROLAC TROMETHAMINE 30 MG/ML
30 INJECTION, SOLUTION INTRAMUSCULAR; INTRAVENOUS ONCE
Status: DISCONTINUED | OUTPATIENT
Start: 2023-01-04 | End: 2023-01-04

## 2023-01-04 RX ORDER — ONDANSETRON 4 MG/1
4 TABLET, ORALLY DISINTEGRATING ORAL ONCE
Status: COMPLETED | OUTPATIENT
Start: 2023-01-04 | End: 2023-01-04

## 2023-01-04 RX ADMIN — KETOROLAC TROMETHAMINE 15 MG: 30 INJECTION, SOLUTION INTRAMUSCULAR; INTRAVENOUS at 20:52

## 2023-01-04 RX ADMIN — ONDANSETRON 4 MG: 4 TABLET, ORALLY DISINTEGRATING ORAL at 20:41

## 2023-01-04 ASSESSMENT — PAIN DESCRIPTION - LOCATION
LOCATION: ABDOMEN
LOCATION: ABDOMEN

## 2023-01-04 ASSESSMENT — PAIN DESCRIPTION - DESCRIPTORS: DESCRIPTORS: DULL;ACHING

## 2023-01-04 ASSESSMENT — PAIN SCALES - GENERAL: PAINLEVEL_OUTOF10: 3

## 2023-01-04 ASSESSMENT — PAIN - FUNCTIONAL ASSESSMENT: PAIN_FUNCTIONAL_ASSESSMENT: 0-10

## 2023-01-04 ASSESSMENT — PAIN DESCRIPTION - ORIENTATION: ORIENTATION: LOWER;OTHER (COMMENT)

## 2023-01-05 LAB — URINE CULTURE, ROUTINE: NORMAL

## 2023-01-05 NOTE — DISCHARGE INSTRUCTIONS
You were seen in the emergency department for abdominal pain, based off your reported symptoms, we are concerned about a hernia. We did recommend CT given the reported constipation, however you decided to leave [address]   Please follow-up with your primary care doctor as soon as possible

## 2023-01-05 NOTE — ED NOTES
Followed up with David Greene on 1/4/2023 at 10:26 PM. Patient left the ED with a disposition of AMA on . Patient cited wait time as reason. Advised patient to follow up with a primary care physician or return to the Emergency Department if symptoms worsen.    ERNESTO Mcguire RN  01/04/23 0929

## 2023-01-05 NOTE — ED PROVIDER NOTES
[address] ED      CHIEF COMPLAINT  Abdominal Pain       HISTORY OF PRESENT ILLNESS  Naseem Tapia is a 30 y.o. female with past medical history of hepatitis C who presents to the ED complaining of lower abdominal pain. Onset of pain: 1w, unclear etiology  Location: periumbilical  Radiation: radites to right periumbilical area  Quality: usually dull, burning  Severity: 3/10  Timing: constant  Palliative Factors: denies  Provocative Factors: lifting    Nausea/Vomiting: nausea  Diarrhea/Constipation: constipation; Last BM: 2d, passing gas  Vaginal Discharge/Bleeding: on menses  Dysuria/urinary frequency: denies  Fever: denies  Previous abdominal surgeries: appendectomy    Old records reviewed: No pertinent information noted. No other complaints, modifying factors or associated symptoms. I have reviewed the following from the nursing documentation.     Past Medical History:   Diagnosis Date    Hepatitis C antibody positive in blood 06/24/2019    Heroin abuse ([facility] [address] 4/15/2015    Unspecified breast disorder      Past Surgical History:   Procedure Laterality Date    LAPAROSCOPIC APPENDECTOMY N/A 6/25/2019    LAPAROSCOPIC APPENDECTOMY performed by Christiano Hoffmann MD at [address] [place] [address]      tooth extractions     Family History   Problem Relation Age of Onset    Diabetes Maternal Grandmother     Stroke Maternal Grandmother     Arthritis Mother         RA     Social History     Socioeconomic History    Marital status: Single     Spouse name: Not on file    Number of children: Not on file    Years of education: Not on file    Highest education level: Not on file   Occupational History    Not on file   Tobacco Use    Smoking status: Every Day     Packs/day: 1.00     Years: 11.00     Pack years: 11.00     Types: Cigarettes    Smokeless tobacco: Never   Substance and Sexual Activity    Alcohol use: No    Drug use: No    Sexual activity: Yes     Partners: Male   Other Topics Concern Not on file   Social History Narrative    Not on file     Social Determinants of Health     Financial Resource Strain: Not on file   Food Insecurity: Not on file   Transportation Needs: Not on file   Physical Activity: Not on file   Stress: Not on file   Social Connections: Not on file   Intimate Partner Violence: Not on file   Housing Stability: Not on file     No current facility-administered medications for this encounter. No current outpatient medications on file. Allergies   Allergen Reactions    Penicillins Hives    Codeine Anxiety       REVIEW OF SYSTEMS  All systems reviewed, pertinent positives per HPI otherwise noted to be negative. PHYSICAL EXAM  BP (!) 144/88   Pulse 73   Temp 98 °F (36.7 °C)   Resp 16   Ht 5' 1\" (1.549 m)   Wt 110 lb (49.9 kg)   SpO2 100%   BMI 20.78 kg/m²    GENERAL APPEARANCE: Awake and alert. Cooperative. no distress. HENT: Normocephalic. Atraumatic. Mucous membranes are moist  NECK: Supple. Full range of motion without pain or stiffness  EYES: PERRL. EOM's grossly intact. HEART/CHEST: RRR. No murmurs. LUNGS: Respirations unlabored. CTAB. Good air exchange. Speaking comfortably in full sentences. ABDOMEN: Tender around the umbilicus, no hernia appreciated. Soft. Non-distended. No masses. No organomegaly. No guarding or rebound. MUSCULOSKELETAL: No extremity edema. Compartments soft. No deformity. No tenderness in the extremities. All extremities neurovascularly intact. SKIN: Warm and dry. No acute rashes. NEUROLOGICAL: Alert and oriented. No gross facial drooping. Strength 5/5, sensation intact. PSYCHIATRIC: Normal mood and affect. LABS  I have reviewed all labs for this visit.    Results for orders placed or performed during the hospital encounter of 01/04/23   CBC with Auto Differential   Result Value Ref Range    WBC 6.8 4.0 - 11.0 K/uL    RBC 4.39 4.00 - 5.20 M/uL    Hemoglobin 13.9 12.0 - 16.0 g/dL    Hematocrit 41.9 36.0 - 48.0 %    MCV 95.4 80.0 - 100.0 fL    MCH 31.6 26.0 - 34.0 pg    MCHC 33.1 31.0 - 36.0 g/dL    RDW 12.5 12.4 - 15.4 %    Platelets 497 994 - 288 K/uL    MPV 12.0 (H) 5.0 - 10.5 fL    PLATELET SLIDE REVIEW Adequate     SLIDE REVIEW see below     Neutrophils % 57.7 %    Lymphocytes % 32.9 %    Monocytes % 6.8 %    Eosinophils % 1.9 %    Basophils % 0.7 %    Neutrophils Absolute 4.0 1.7 - 7.7 K/uL    Lymphocytes Absolute 2.3 1.0 - 5.1 K/uL    Monocytes Absolute 0.5 0.0 - 1.3 K/uL    Eosinophils Absolute 0.1 0.0 - 0.6 K/uL    Basophils Absolute 0.0 0.0 - 0.2 K/uL    RBC Morphology Normal    Comprehensive Metabolic Panel   Result Value Ref Range    Sodium 140 136 - 145 mmol/L    Potassium 4.0 3.5 - 5.1 mmol/L    Chloride 101 99 - 110 mmol/L    CO2 27 21 - 32 mmol/L    Anion Gap 12 3 - 16    Glucose 121 (H) 70 - 99 mg/dL    BUN 12 7 - 20 mg/dL    Creatinine 0.7 0.6 - 1.1 mg/dL    Est, Glom Filt Rate >60 >60    Calcium 10.0 8.3 - 10.6 mg/dL    Total Protein 7.4 6.4 - 8.2 g/dL    Albumin 4.7 3.4 - 5.0 g/dL    Albumin/Globulin Ratio 1.7 1.1 - 2.2    Total Bilirubin <0.2 0.0 - 1.0 mg/dL    Alkaline Phosphatase 28 (L) 40 - 129 U/L    ALT 19 10 - 40 U/L    AST 31 15 - 37 U/L   Lipase   Result Value Ref Range    Lipase 41.0 13.0 - 60.0 U/L   Urinalysis with Reflex to Culture    Specimen: Urine, clean catch   Result Value Ref Range    Color, UA Yellow Straw/Yellow    Clarity, UA SL CLOUDY (A) Clear    Glucose, Ur Negative Negative mg/dL    Bilirubin Urine Negative Negative    Ketones, Urine Negative Negative mg/dL    Specific Gravity, UA 1.020 1.005 - 1.030    Blood, Urine LARGE (A) Negative    pH, UA 7.0 5.0 - 8.0    Protein, UA Negative Negative mg/dL    Urobilinogen, Urine 0.2 <2.0 E.U./dL    Nitrite, Urine Negative Negative    Leukocyte Esterase, Urine TRACE (A) Negative    Microscopic Examination YES     Urine Type Voided     Urine Reflex to Culture Not Indicated    Microscopic Urinalysis   Result Value Ref Range    Mucus, UA 2+ (A) None Seen /LPF    WBC, UA 3-5 0 - 5 /HPF    RBC, UA 3-4 0 - 4 /HPF    Epithelial Cells, UA 6-10 (A) 0 - 5 /HPF    Bacteria, UA 2+ (A) None Seen /HPF   Pregnancy, urine   Result Value Ref Range    HCG(Urine) Pregnancy Test Negative Detects HCG level >20 MIU/mL       RADIOLOGY  No orders to display           ED COURSE / MDM  Patient seen and evaluated. Old records reviewed and pertinent information included in HPI. Labs and imaging reviewed and results discussed with patient. Overall, well appearing patient in no distress, presenting for abdominal pain. History obtained from patient. Physical exam remarkable for periumbilical tenderness, no appreciable hernia the patient reports feeling a lump in this area. Records Reviewed: No pertinent formation identified    Differential diagnosis includes but is not limited to: Appendicitis, bowel obstruction,  diverticulitis, hernia, UTI, AAA, pregnancy, ectopic pregnancy    Laboratory studies obtained. ED Course as of 01/06/23 1013   Lipase within normal limits, low suspicion for pancreatitis [ER]     No leukocytosis, anemia, thrombocytopenia [ER]     Urinalysis shows large blood, patient is on her menstrual cycle. Does show trace leukocyte Estrace, only 3-5 white blood cells. Patient denies any dysuria, low suspicion for UTI. We will culture but do not feel the patient requires antibiotics at this time. [ER]     No electrolyte abnormalities or evidence of kidney dysfunction [ER]     Liver Function testing unremarkable, patient does not have right upper quadrant pain or tenderness.   Low suspicion for hepatitis or other acute liver pathology [ER]     Patient is pregnant [ER]      ED Course User Index  [ER] Lesley Valentin MD       During the patient's ED course, the patient was given:  Medications   ondansetron (ZOFRAN-ODT) disintegrating tablet 4 mg (4 mg Oral Given 1/4/23 2041)   ketorolac (TORADOL) injection 15 mg (15 mg IntraVENous Given 1/4/23 2052)        CONSULTS: (Who and What was discussed)  None    Is this patient to be included in the SEP-1 Core Measure due to severe sepsis or septic shock? No   Exclusion criteria - the patient is NOT to be included for SEP-1 Core Measure due to:  2+ SIRS criteria are not met    Social Determinants affecting Dx or Tx: None identified    Laboratory studies reassuring. However, plan was to obtain CT scan. CT scan had been ordered given patient reporting symptoms consistent with hernia and stating she had not had a bowel meant in 2 days. Wanted to confirm no evidence of obstruction. Patient also complained of periumbilical pain which could be consistent with appendicitis. However, patient left AGAINST MEDICAL ADVICE prior to completion CT scan. I discussed the nature and purpose, risks and benefits, as well as, the alternatives of imaging with Dawne Landau. Dawne Landau was given the time and opportunity to ask questions and consider their options, and after the discussion, Dawne Landau decided to refuse. I informed Dawne Landau that refusal could lead to, but was not limited to, death, permanent disability, or severe pain. If present, I asked the relatives or significant others of Dawne Landau to dissuade them without success. Prior to refusing, their nurse and I determined and agreed that Dawne Landau had the capacity to make this decision and understood the consequences of that decision. They appear clinically sober, to be mentating appropriately, free from distracting injury, appear to have intact insight, judgement, and reason. Specifically, they were able to verbally state back in a coherent manner their current medical condition, the proposed course of treatment, and the risks/benfits/ alternatives of treatment verses leaving against medical advice.  Dawne Landau signed the refusal of treatment form and their nurse signed the form agreeing that the patient/guardian had received informed consent. After refusal, I made every reasonable opportunity to treat Reynaldo Ríos to the best of my ability. They understand that they may return to seek medical attention here whenever they choose. Patient was provided with referrals to general surgery. Return precautions given. Encouraged PCP follow-up in 1 day. Patient discharged against medical advice. I am the Primary Clinician of Record. CLINICAL IMPRESSION  1. Periumbilical abdominal pain    2. Constipation, unspecified constipation type    3. Left against medical advice        Blood pressure (!) 144/88, pulse 73, temperature 98 °F (36.7 °C), resp. rate 16, height 5' 1\" (1.549 m), weight 110 lb (49.9 kg), SpO2 100 %, currently breastfeeding. Germania Gannon was discharged to home [address]      Patient was given scripts for the following medications. I counseled patient how to take these medications. There are no discharge medications for this patient. Follow-up with:  Shira Jimenez MD  [facility] [address] [place]  [phone]    Schedule an appointment as soon as possible for a visit       [facility] Pre-[facility]  [phone]  Schedule an appointment as soon as possible for a visit       [facility] ED  [address] [facility]  [phone]  Go to   As needed, If symptoms worsen    DISCLAIMER: This chart was created using Dragon dictation software. Efforts were made by me to ensure accuracy, however some errors may be present due to limitations of this technology and occasionally words are not transcribed correctly.        Keshia Powers MD  01/06/23 1016

## 2023-04-28 LAB — PAP SMEAR, EXTERNAL: NORMAL

## 2023-05-19 ENCOUNTER — OFFICE VISIT (OUTPATIENT)
Dept: PRIMARY CARE CLINIC | Age: 38
End: 2023-05-19
Payer: COMMERCIAL

## 2023-05-19 VITALS
RESPIRATION RATE: 16 BRPM | OXYGEN SATURATION: 100 % | HEIGHT: 61 IN | SYSTOLIC BLOOD PRESSURE: 116 MMHG | TEMPERATURE: 98 F | DIASTOLIC BLOOD PRESSURE: 74 MMHG | WEIGHT: 110.23 LBS | HEART RATE: 79 BPM | BODY MASS INDEX: 20.81 KG/M2

## 2023-05-19 DIAGNOSIS — Z11.4 ENCOUNTER FOR SCREENING FOR HIV: ICD-10-CM

## 2023-05-19 DIAGNOSIS — Z00.00 HEALTHCARE MAINTENANCE: Primary | ICD-10-CM

## 2023-05-19 DIAGNOSIS — Z13.1 DIABETES MELLITUS SCREENING: ICD-10-CM

## 2023-05-19 DIAGNOSIS — F41.9 ANXIETY: ICD-10-CM

## 2023-05-19 DIAGNOSIS — R55 NEAR SYNCOPE: ICD-10-CM

## 2023-05-19 DIAGNOSIS — Z11.59 NEED FOR HEPATITIS B SCREENING TEST: ICD-10-CM

## 2023-05-19 DIAGNOSIS — R76.8 HEPATITIS C ANTIBODY TEST POSITIVE: ICD-10-CM

## 2023-05-19 DIAGNOSIS — Z13.220 SCREENING CHOLESTEROL LEVEL: ICD-10-CM

## 2023-05-19 PROBLEM — Z90.49 S/P LAPAROSCOPIC APPENDECTOMY: Status: RESOLVED | Noted: 2019-07-19 | Resolved: 2023-05-19

## 2023-05-19 PROBLEM — K37 APPENDICITIS: Status: RESOLVED | Noted: 2019-06-24 | Resolved: 2023-05-19

## 2023-05-19 PROBLEM — K35.30 ACUTE APPENDICITIS WITH LOCALIZED PERITONITIS, WITHOUT PERFORATION OR GANGRENE: Status: RESOLVED | Noted: 2019-06-24 | Resolved: 2023-05-19

## 2023-05-19 LAB
ALBUMIN SERPL-MCNC: 4.8 G/DL (ref 3.4–5)
ALBUMIN/GLOB SERPL: 2.1 {RATIO} (ref 1.1–2.2)
ALP SERPL-CCNC: 32 U/L (ref 40–129)
ALT SERPL-CCNC: 18 U/L (ref 10–40)
ANION GAP SERPL CALCULATED.3IONS-SCNC: 14 MMOL/L (ref 3–16)
AST SERPL-CCNC: 28 U/L (ref 15–37)
BILIRUB SERPL-MCNC: <0.2 MG/DL (ref 0–1)
BUN SERPL-MCNC: 14 MG/DL (ref 7–20)
CALCIUM SERPL-MCNC: 9.3 MG/DL (ref 8.3–10.6)
CHLORIDE SERPL-SCNC: 104 MMOL/L (ref 99–110)
CHOLEST SERPL-MCNC: 204 MG/DL (ref 0–199)
CO2 SERPL-SCNC: 24 MMOL/L (ref 21–32)
CREAT SERPL-MCNC: 0.7 MG/DL (ref 0.6–1.1)
DEPRECATED RDW RBC AUTO: 11.9 % (ref 12.4–15.4)
GFR SERPLBLD CREATININE-BSD FMLA CKD-EPI: >60 ML/MIN/{1.73_M2}
GLUCOSE SERPL-MCNC: 104 MG/DL (ref 70–99)
HBV SURFACE AB SERPL IA-ACNC: 84.46 MIU/ML
HCT VFR BLD AUTO: 41.9 % (ref 36–48)
HDLC SERPL-MCNC: 73 MG/DL (ref 40–60)
HGB BLD-MCNC: 14 G/DL (ref 12–16)
LDLC SERPL CALC-MCNC: 108 MG/DL
MCH RBC QN AUTO: 32 PG (ref 26–34)
MCHC RBC AUTO-ENTMCNC: 33.3 G/DL (ref 31–36)
MCV RBC AUTO: 96 FL (ref 80–100)
PLATELET # BLD AUTO: 109 K/UL (ref 135–450)
PLATELET BLD QL SMEAR: ABNORMAL
PMV BLD AUTO: 12.8 FL (ref 5–10.5)
POTASSIUM SERPL-SCNC: 4.5 MMOL/L (ref 3.5–5.1)
PROT SERPL-MCNC: 7.1 G/DL (ref 6.4–8.2)
RBC # BLD AUTO: 4.36 M/UL (ref 4–5.2)
SLIDE REVIEW: ABNORMAL
SODIUM SERPL-SCNC: 142 MMOL/L (ref 136–145)
TRIGL SERPL-MCNC: 116 MG/DL (ref 0–150)
TSH SERPL DL<=0.005 MIU/L-ACNC: 0.69 UIU/ML (ref 0.27–4.2)
VLDLC SERPL CALC-MCNC: 23 MG/DL
WBC # BLD AUTO: 6.2 K/UL (ref 4–11)

## 2023-05-19 PROCEDURE — 99385 PREV VISIT NEW AGE 18-39: CPT | Performed by: FAMILY MEDICINE

## 2023-05-19 RX ORDER — SERTRALINE HYDROCHLORIDE 100 MG/1
100 TABLET, FILM COATED ORAL DAILY
COMMUNITY
Start: 2022-03-09

## 2023-05-19 SDOH — ECONOMIC STABILITY: FOOD INSECURITY: WITHIN THE PAST 12 MONTHS, THE FOOD YOU BOUGHT JUST DIDN'T LAST AND YOU DIDN'T HAVE MONEY TO GET MORE.: NEVER TRUE

## 2023-05-19 SDOH — ECONOMIC STABILITY: INCOME INSECURITY: HOW HARD IS IT FOR YOU TO PAY FOR THE VERY BASICS LIKE FOOD, HOUSING, MEDICAL CARE, AND HEATING?: NOT HARD AT ALL

## 2023-05-19 SDOH — ECONOMIC STABILITY: FOOD INSECURITY: WITHIN THE PAST 12 MONTHS, YOU WORRIED THAT YOUR FOOD WOULD RUN OUT BEFORE YOU GOT MONEY TO BUY MORE.: NEVER TRUE

## 2023-05-19 SDOH — ECONOMIC STABILITY: HOUSING INSECURITY
IN THE LAST 12 MONTHS, WAS THERE A TIME WHEN YOU DID NOT HAVE A STEADY PLACE TO SLEEP OR SLEPT IN A SHELTER (INCLUDING NOW)?: NO

## 2023-05-19 ASSESSMENT — PATIENT HEALTH QUESTIONNAIRE - PHQ9
SUM OF ALL RESPONSES TO PHQ QUESTIONS 1-9: 0
2. FEELING DOWN, DEPRESSED OR HOPELESS: 0
SUM OF ALL RESPONSES TO PHQ QUESTIONS 1-9: 0
1. LITTLE INTEREST OR PLEASURE IN DOING THINGS: 0
SUM OF ALL RESPONSES TO PHQ QUESTIONS 1-9: 0
SUM OF ALL RESPONSES TO PHQ9 QUESTIONS 1 & 2: 0
SUM OF ALL RESPONSES TO PHQ QUESTIONS 1-9: 0

## 2023-05-19 ASSESSMENT — ENCOUNTER SYMPTOMS
NAUSEA: 0
VOMITING: 0
SHORTNESS OF BREATH: 0
COUGH: 0
BLOOD IN STOOL: 0
RHINORRHEA: 0
ABDOMINAL PAIN: 0
DIARRHEA: 0
COLOR CHANGE: 0

## 2023-05-19 NOTE — ASSESSMENT & PLAN NOTE
Overall doing okay. Age appropriate screening provided as per orders below. Counseled briefly and provided written materials on recommendations for exercise. Reminded to schedule eye exam when able. Other problems as otherwise noted.

## 2023-05-19 NOTE — PATIENT INSTRUCTIONS
For help and support with [employer] mainor/portal set-up, please call [phone]. Reminder: Please call to schedule eye exam when able. Water: 64 oz per day  Zevia soda    Lifestyle modifications discussed today:    Exercise: In accordance with AHA/ACC guidelines:    - Get at least 150 minutes per week of moderate-intensity aerobic activity OR 75 minutes per week of vigorous aerobic activity, OR a combination of both, preferably spread throughout the week. - Add moderate- to high-intensity muscle-strengthening activity (such as resistance or weights) on at least 2 days per week. - Gain even more benefits by being active at least 300 minutes (5 hours) per week. Increase amount and intensity gradually over time. The National Suicide Prevention Lifeline is a [facility] suicide prevention network of 161 crisis centers that provides a 24/7, toll-free hotline available to anyone in suicidal crisis or emotional distress. The number is: 8-552-929-468-258-0754. [place] - [place] based resource for finding mental health resources and providers       [address]  [phone] option 1     Please find our [email or web address] below for your convenience! CENTRAL LOCATIONS    1) [place] [address], [address] [facility], [address]  Phone: [phone]    2) [address]  [facility], [address]  Phone: Ranulfo Sawyer    3) [address] [facility], [address]  ANIL Cummings   Phone: [phone]    5) [facility], [address]  Phone: [phone]    5) [facility] [address] [facility] [address]  Phone: [address]    6) [address] [facility], [address]  [address], [address]  Phone: [phone]    7) [place] [address] Naz Lambert

## 2023-05-19 NOTE — PROGRESS NOTES
Nestor Bailey is a 41y.o. year old female here for:    Chief Complaint:    Chief Complaint   Patient presents with    [facility]    Anxiety    Annual Exam     Subjective: Today, her current concerns include:    HPI:  #Anxiety  - Onset: Years  - Context: Not working with a therapist currently   - Quality: PHQ2 score of 0 score of 10, YESSI 11, MDQ +. Denied SI/HI  - Progression: Stable  - Modifiers: On Zoloft 100 mg QD  - Associated Symptoms: Per ROS as otherwise stated in this note    #Near syncope  - Onset: 2 days ago  - Context: Was at work and felt like she was dizzy and was about to pass out - she was sitting. No blood sugar taken but she felt better after eating and hydration. That morning had eaten. Hydration wise had drank cup of coffee and reports a lot of water. Episode lasted 15 minutes or so. Denied LOC. - Quality: Dizzy and headache with floaters in vision  - Progression: Improved now  - Modifiers: As above  - Associated Symptoms: Per ROS as otherwise stated in this note  - Previous occurrence: Never before    Preventive Services:    Health Maintenance History:  Patient exercises regularly? Nothing regimented - stays active  Diet? Tries to eat a healthy diet but lots of soda  Dental: N/A: Has full upper and lower dentures  Glasses/Eyes: Last visit was 2 years ago    Other Health Maintenance History:  Patient has previous history of abnormal breast mass?: no  Patient has previous history of abnormal pap smear?:  no  Patient is on Hormone Replacement therapy or Birth Control? Yes - on Depo with OB GYN  Sexually active? Yes with one monogamous male partner  In the past two weeks have they been bothered by feeling \"down\", depressed or hopeless?  no  In the past two weeks, have they experienced a loss of interest or pleasure in doing things?  no  In the last year, have you fallen more than once or been seriously injured in a fall?  no  Advance Directives: Not in place.  Provided instructions

## 2023-05-19 NOTE — ASSESSMENT & PLAN NOTE
Age/risk (near syncope) appropriate screening provided as per orders below. Detail Level: Zone Otc Regimen: Recommended patient to continue using multivitamin Initiate Treatment: triamcinolone acetonide 0.1 % topical cream BID\\nDays Supply: 30\\nSig: Apply to affected areas twice daily for 4 days then once daily for 3 days. Continue Regimen: fluocinolone 0.01 % topical solution Sig: Apply to affected area on scalp once every night for the next 3 weeks then every other evening for 2 weeks

## 2023-05-19 NOTE — ASSESSMENT & PLAN NOTE
Noted per chart review, patient upset today as no one had informed her of this before. Will check confirmatory tests. Referral to GI today in preparation. Hx of IVDU - cessation since 2018, praised on this.

## 2023-05-19 NOTE — ASSESSMENT & PLAN NOTE
Improved now. Unclear if vasovagal (provided information on this), dehydration or low blood sugar (although unlikely given that she reports she ate and hydrated well). Will screen for DM2 and check lipid panel. Hydration encouraged. Will also check CBC to r/o anemia and TSH. If poorly progressing, never treated Hep C, can also be due to this - see other notes for this separately. Normal neuro exam today in office. Fall/call back/ED precautions discussed.

## 2023-05-20 LAB
EST. AVERAGE GLUCOSE BLD GHB EST-MCNC: 105.4 MG/DL
HBA1C MFR BLD: 5.3 %
HIV 1+2 AB+HIV1 P24 AG SERPL QL IA: NORMAL
HIV 2 AB SERPL QL IA: NORMAL
HIV1 AB SERPL QL IA: NORMAL
HIV1 P24 AG SERPL QL IA: NORMAL

## 2023-05-21 LAB
HCV AB S/CO SERPL IA: >11 IV
HCV AB SERPL QL IA: ABNORMAL

## 2023-05-22 PROBLEM — R76.8 HEPATITIS C ANTIBODY TEST POSITIVE: Status: RESOLVED | Noted: 2023-05-19 | Resolved: 2023-05-22

## 2023-05-22 PROBLEM — B18.2 HEP C W/O COMA, CHRONIC (HCC): Status: ACTIVE | Noted: 2023-05-22

## 2023-05-22 PROBLEM — Z78.9 HEPATITIS B IMMUNE: Status: ACTIVE | Noted: 2023-05-22

## 2023-05-22 PROBLEM — Z13.1 DIABETES MELLITUS SCREENING: Status: RESOLVED | Noted: 2023-05-19 | Resolved: 2023-05-22

## 2023-05-22 PROBLEM — Z11.4 ENCOUNTER FOR SCREENING FOR HIV: Status: RESOLVED | Noted: 2023-05-19 | Resolved: 2023-05-22

## 2023-05-22 PROBLEM — Z11.59 NEED FOR HEPATITIS B SCREENING TEST: Status: RESOLVED | Noted: 2023-05-19 | Resolved: 2023-05-22

## 2023-05-22 PROBLEM — Z13.220 SCREENING CHOLESTEROL LEVEL: Status: RESOLVED | Noted: 2023-05-19 | Resolved: 2023-05-22

## 2023-05-22 PROBLEM — B19.20 HEPATITIS C VIRUS INFECTION WITHOUT HEPATIC COMA: Status: ACTIVE | Noted: 2023-05-22

## 2023-05-22 PROBLEM — B19.20 HEPATITIS C VIRUS INFECTION WITHOUT HEPATIC COMA: Status: RESOLVED | Noted: 2023-05-22 | Resolved: 2023-05-22

## 2023-05-22 LAB
HCV RNA SERPL NAA+PROBE-ACNC: ABNORMAL IU/ML
HCV RNA SERPL NAA+PROBE-LOG IU: 5.98 LOG IU/ML
HCV RNA SERPL QL NAA+PROBE: DETECTED

## 2023-05-23 ENCOUNTER — TELEPHONE (OUTPATIENT)
Dept: PRIMARY CARE CLINIC | Age: 38
End: 2023-05-23

## 2023-06-18 PROBLEM — Z00.00 HEALTHCARE MAINTENANCE: Status: RESOLVED | Noted: 2023-05-19 | Resolved: 2023-06-18

## 2023-08-14 ENCOUNTER — TELEPHONE (OUTPATIENT)
Dept: PRIMARY CARE CLINIC | Age: 38
End: 2023-08-14

## 2023-08-14 NOTE — TELEPHONE ENCOUNTER
----- Message from Anna Leon MD sent at 5/22/2023 11:16 AM EDT -----  Call patient to offer check in visit for Hep C - can be video visit. Please and thank you!

## 2023-09-15 ENCOUNTER — PATIENT MESSAGE (OUTPATIENT)
Dept: PRIMARY CARE CLINIC | Age: 38
End: 2023-09-15

## 2023-10-20 NOTE — TELEPHONE ENCOUNTER
Attempted to reach pt on an alternate phone [phone] (pt's spouse). Left message for Alejandra Kuhn to call office.

## 2025-05-02 NOTE — ASSESSMENT & PLAN NOTE
Well controlled on current regimen. PHQ2 score of 0 score of 10, YESSI 11, MDQ +. Denied SI/HI. Provided SI Hotline number as well as [place] to establish care for this. Call back/ED precautions discussed. [Negative] : Neurological [FreeTextEntry2] : As Per HPI [FreeTextEntry5] : As Per HPI [FreeTextEntry6] : As Per HPI [FreeTextEntry7] : GERD [de-identified] : As Per HPI

## (undated) DEVICE — Z DISCONTINUED NO SUB IDED GLOVE SURG SZ 6 L12IN FNGR THK13MIL WHT ISOLEX POLYISOPRENE

## (undated) DEVICE — TROCAR: Brand: KII FIOS FIRST ENTRY

## (undated) DEVICE — CUTTER ENDOSCP L340MM LIN ARTC SGL STROKE FIRING ENDOPATH

## (undated) DEVICE — POUCH SPEC RETRV ENDO 3X6 IN HNDL

## (undated) DEVICE — GLOVE SURG SZ 7 L12IN FNGR THK75MIL WHT LTX POLYMER BEAD

## (undated) DEVICE — GOWN SIRUS NONREIN XL W/TWL: Brand: MEDLINE INDUSTRIES, INC.

## (undated) DEVICE — RELOAD STPL H1-2.5X45MM VASC THN TISS WHT 6 ROW B FRM SGL

## (undated) DEVICE — TROCAR: Brand: KII SLEEVE

## (undated) DEVICE — RELOAD STPL SZ 0 L45MM DIA3.5MM 0DEG STD REG TISS BLU TI

## (undated) DEVICE — Z DISCONTINUED GLOVE SURG SZ 7.5 L12IN FNGR THK13MIL WHT ISOLEX

## (undated) DEVICE — Z DISCONTINUED GLOVE SURG SZ 7 L12IN FNGR THK13MIL WHT ISOLEX POLYISOPRENE

## (undated) DEVICE — SUTURE MCRYL + SZ 4-0 L18IN ABSRB UD L19MM PS-2 3/8 CIR MCP496G

## (undated) DEVICE — SUTURE VCRL + SZ 0 L27IN ABSRB VLT L26MM UR-6 5/8 CIR VCP603H

## (undated) DEVICE — Z DISCONTINUED USE 2275686 GLOVE SURG SZ 8 L12IN FNGR THK13MIL WHT ISOLEX POLYISOPRENE

## (undated) DEVICE — Z DISCONTINUED USE 2275683 GLOVE SURG SZ 6.5 L12IN FNGR THK13MIL WHT ISOLEX

## (undated) DEVICE — PACK PROCEDURE SURG LAPAROSCOPY APPY CDS